# Patient Record
Sex: FEMALE | Race: OTHER | HISPANIC OR LATINO | Employment: FULL TIME | URBAN - METROPOLITAN AREA
[De-identification: names, ages, dates, MRNs, and addresses within clinical notes are randomized per-mention and may not be internally consistent; named-entity substitution may affect disease eponyms.]

---

## 2019-03-02 ENCOUNTER — OFFICE VISIT (OUTPATIENT)
Dept: URGENT CARE | Facility: CLINIC | Age: 29
End: 2019-03-02
Payer: COMMERCIAL

## 2019-03-02 VITALS
RESPIRATION RATE: 14 BRPM | TEMPERATURE: 98 F | HEART RATE: 75 BPM | SYSTOLIC BLOOD PRESSURE: 122 MMHG | DIASTOLIC BLOOD PRESSURE: 84 MMHG

## 2019-03-02 DIAGNOSIS — J02.9 VIRAL PHARYNGITIS: ICD-10-CM

## 2019-03-02 DIAGNOSIS — R05.9 COUGH: Primary | ICD-10-CM

## 2019-03-02 LAB
CTP QC/QA: YES
FLUAV AG NPH QL: NEGATIVE
FLUBV AG NPH QL: NEGATIVE

## 2019-03-02 PROCEDURE — 99204 OFFICE O/P NEW MOD 45 MIN: CPT | Mod: 25,S$GLB,, | Performed by: FAMILY MEDICINE

## 2019-03-02 PROCEDURE — 87804 INFLUENZA ASSAY W/OPTIC: CPT | Mod: QW,S$GLB,, | Performed by: FAMILY MEDICINE

## 2019-03-02 PROCEDURE — 96372 THER/PROPH/DIAG INJ SC/IM: CPT | Mod: S$GLB,,, | Performed by: FAMILY MEDICINE

## 2019-03-02 PROCEDURE — 99204 PR OFFICE/OUTPT VISIT, NEW, LEVL IV, 45-59 MIN: ICD-10-PCS | Mod: 25,S$GLB,, | Performed by: FAMILY MEDICINE

## 2019-03-02 PROCEDURE — 96372 PR INJECTION,THERAP/PROPH/DIAG2ST, IM OR SUBCUT: ICD-10-PCS | Mod: S$GLB,,, | Performed by: FAMILY MEDICINE

## 2019-03-02 PROCEDURE — 87804 POCT INFLUENZA A/B: ICD-10-PCS | Mod: QW,S$GLB,, | Performed by: FAMILY MEDICINE

## 2019-03-02 RX ORDER — DEXAMETHASONE SODIUM PHOSPHATE 100 MG/10ML
10 INJECTION INTRAMUSCULAR; INTRAVENOUS
Status: COMPLETED | OUTPATIENT
Start: 2019-03-02 | End: 2019-03-02

## 2019-03-02 RX ADMIN — DEXAMETHASONE SODIUM PHOSPHATE 10 MG: 100 INJECTION INTRAMUSCULAR; INTRAVENOUS at 04:03

## 2019-03-02 NOTE — LETTER
March 2, 2019      [facility] [address], [address]  [facility] [address]  Phone: [phone]  Fax: [phone]       Patient: Divya Wilson   YOB: 1990  Date of Visit: 03/02/2019    To Whom It May Concern:    Harry Wilson  was at [facility] on 03/02/2019. She may return to work/school on Tuesday, 03/05/2019 . If you have any questions or concerns, or if I can be of further assistance, please do not hesitate to contact me.    Sincerely,    Allan Rodriguez M.D.

## 2019-03-02 NOTE — PATIENT INSTRUCTIONS
Viral Pharyngitis (Sore Throat)    You (or your child, if your child is the patient) have pharyngitis (sore throat). This infection is caused by a virus. It can cause throat pain that is worse when swallowing, aching all over, headache, and fever. The infection may be spread by coughing, kissing, or touching others after touching your mouth or nose. Antibiotic medications do not work against viruses, so they are not used for treating this condition.  Home care  · If your symptoms are severe, rest at home. Return to work or school when you feel well enough.   · Drink plenty of fluids to avoid dehydration.  · For children: Use acetaminophen for fever, fussiness or discomfort. In infants over six months of age, you may use ibuprofen instead of acetaminophen. (NOTE: If your child has chronic liver or kidney disease or ever had a stomach ulcer or GI bleeding, talk with your doctor before using these medicines.) (NOTE: Aspirin should never be used in anyone under 18 years of age who is ill with a fever. It may cause severe liver damage.)   · For adults: You may use acetaminophen or ibuprofen to control pain or fever, unless another medicine was prescribed for this. (NOTE: If you have chronic liver or kidney disease or ever had a stomach ulcer or GI bleeding, talk with your doctor before using these medicines.)  · Throat lozenges or numbing throat sprays can help reduce pain. Gargling with warm salt water will also help reduce throat pain. For this, dissolve 1/2 teaspoon of salt in 1 glass of warm water. To help soothe a sore throat, children can sip on juice or a popsicle. Children 5 years and older can also suck on a lollipop or hard candy.  · Avoid salty or spicy foods, which can be irritating to the throat.  Follow-up care  Follow up with your healthcare provider or our staff if you are not improving over the next week.  When to seek medical advice  Call your healthcare provider right away if any of these  occur:  · Fever as directed by your doctor.  For children, seek care if:  ¨ Your child is of any age and has repeated fevers above 104°F (40°C).  ¨ Your child is younger than 2 years of age and has a fever of 100.4°F (38°C) that continues for more than 1 day.  ¨ Your child is 2 years old or older and has a fever of 100.4°F (38°C) that continues for more than 3 days.  · New or worsening ear pain, sinus pain, or headache  · Painful lumps in the back of neck  · Stiff neck  · Lymph nodes are getting larger  · Inability to swallow liquids, excessive drooling, or inability to open mouth wide due to throat pain  · Signs of dehydration (very dark urine or no urine, sunken eyes, dizziness)  · Trouble breathing or noisy breathing  · Muffled voice  · New rash  · Child appears to be getting sicker  Date Last Reviewed: 4/13/2015  © 7314-5514 [employer] [address] [place] [address], [place] [address]. All rights reserved. This information is not intended as a substitute for professional medical care. Always follow your healthcare professional's instructions.

## 2019-03-02 NOTE — PROGRESS NOTES
Subjective:       Patient ID: Divya Wilson is a 28 y.o. female.    Vitals:  oral temperature is 97.5 °F (36.4 °C). Her blood pressure is 122/84 and her pulse is 75. Her respiration is 14.     Chief Complaint: Nasal Congestion    Pt c/o nasal congestion, started yesterday, post nasal drip, sneezing, productive cough, sore throat, bilateral ear congestion, fever unmeasured, taking elderberry cold and flu with no relief       URI    This is a new problem. The current episode started yesterday. The problem has been unchanged. Associated symptoms include congestion, coughing, sneezing and a sore throat. Pertinent negatives include no ear pain, headaches, nausea, rash, sinus pain, vomiting or wheezing. Treatments tried: elderberry cold and flu. The treatment provided no relief.       Constitution: Positive for fatigue and fever (unmeasured). Negative for chills and sweating.   HENT: Positive for congestion and sore throat. Negative for ear pain, sinus pain, sinus pressure and voice change.    Neck: Negative for painful lymph nodes.   Eyes: Negative for eye redness.   Respiratory: Positive for cough and sputum production. Negative for chest tightness, bloody sputum, COPD, shortness of breath, stridor, wheezing and asthma.    Gastrointestinal: Negative for nausea and vomiting.   Musculoskeletal: Negative for muscle ache.   Skin: Negative for rash.   Allergic/Immunologic: Positive for sneezing. Negative for seasonal allergies and asthma.   Neurological: Negative for headaches.   Hematologic/Lymphatic: Negative for swollen lymph nodes.       Objective:      Physical Exam   Constitutional: She is oriented to person, place, and time. She appears well-developed and well-nourished. She is cooperative.  Non-toxic appearance. She does not appear ill. No distress.   HENT:   Head: Normocephalic and atraumatic.   Right Ear: Hearing, tympanic membrane, external ear and ear canal normal.   Left Ear: Hearing, tympanic membrane,  external ear and ear canal normal.   Nose: Nose normal. No mucosal edema, rhinorrhea or nasal deformity. No epistaxis. Right sinus exhibits no maxillary sinus tenderness and no frontal sinus tenderness. Left sinus exhibits no maxillary sinus tenderness and no frontal sinus tenderness.   Mouth/Throat: Uvula is midline, oropharynx is clear and moist and mucous membranes are normal. No trismus in the jaw. Normal dentition. No uvula swelling. No posterior oropharyngeal erythema.   Eyes: Conjunctivae and lids are normal. No scleral icterus.   Sclera clear bilat   Neck: Trachea normal, full passive range of motion without pain and phonation normal. Neck supple.   Cardiovascular: Normal rate, regular rhythm, normal heart sounds, intact distal pulses and normal pulses.   Pulmonary/Chest: Effort normal and breath sounds normal. No respiratory distress.   Abdominal: Soft. Normal appearance and bowel sounds are normal. She exhibits no distension. There is no tenderness.   Musculoskeletal: Normal range of motion. She exhibits no edema or deformity.   Neurological: She is alert and oriented to person, place, and time. She exhibits normal muscle tone. Coordination normal.   Skin: Skin is warm, dry and intact. She is not diaphoretic. No pallor.   Psychiatric: She has a normal mood and affect. Her speech is normal and behavior is normal. Judgment and thought content normal. Cognition and memory are normal.   Nursing note and vitals reviewed.      Assessment:       1. Cough    2. Viral pharyngitis        Plan:         Cough  -     POCT Influenza A/B    Viral pharyngitis    Other orders  -     dexamethasone injection 10 mg     Viral precautions discussed.  Influenza titers are negative.  Infectious precautions also reviewed literature provided patient is stable for discharge.

## 2020-08-03 ENCOUNTER — OFFICE VISIT (OUTPATIENT)
Dept: URGENT CARE | Facility: CLINIC | Age: 30
End: 2020-08-03
Payer: COMMERCIAL

## 2020-08-03 VITALS
SYSTOLIC BLOOD PRESSURE: 130 MMHG | TEMPERATURE: 98 F | RESPIRATION RATE: 18 BRPM | DIASTOLIC BLOOD PRESSURE: 87 MMHG | WEIGHT: 186 LBS | HEART RATE: 82 BPM | BODY MASS INDEX: 34.23 KG/M2 | HEIGHT: 62 IN | OXYGEN SATURATION: 98 %

## 2020-08-03 DIAGNOSIS — M54.9 DORSALGIA, UNSPECIFIED: ICD-10-CM

## 2020-08-03 DIAGNOSIS — M54.50 ACUTE MIDLINE LOW BACK PAIN WITHOUT SCIATICA: Primary | ICD-10-CM
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
## 2020-08-03 PROCEDURE — 99214 PR OFFICE/OUTPT VISIT, EST, LEVL IV, 30-39 MIN: ICD-10-PCS | Mod: S$GLB,,, | Performed by: NURSE PRACTITIONER

## 2020-08-03 PROCEDURE — 99214 OFFICE O/P EST MOD 30 MIN: CPT | Mod: S$GLB,,, | Performed by: NURSE PRACTITIONER

## 2020-08-03 PROCEDURE — 72100 X-RAY EXAM L-S SPINE 2/3 VWS: CPT | Mod: S$GLB,,, | Performed by: RADIOLOGY

## 2020-08-03 PROCEDURE — 72100 XR LUMBAR SPINE 2 OR 3 VIEWS: ICD-10-PCS | Mod: S$GLB,,, | Performed by: RADIOLOGY

## 2020-08-03 RX ORDER — NAPROXEN 500 MG/1
500 TABLET ORAL 2 TIMES DAILY WITH MEALS
Qty: 20 TABLET | Refills: 0 | Status: SHIPPED | OUTPATIENT
Start: 2020-08-03 | End: 2020-08-03

## 2020-08-03 RX ORDER — CYCLOBENZAPRINE HCL 10 MG
10 TABLET ORAL 3 TIMES DAILY PRN
Qty: 30 TABLET | Refills: 0 | Status: SHIPPED | OUTPATIENT
Start: 2020-08-03 | End: 2020-08-03

## 2020-08-03 RX ORDER — NAPROXEN 500 MG/1
500 TABLET ORAL 2 TIMES DAILY WITH MEALS
Qty: 20 TABLET | Refills: 0 | Status: SHIPPED | OUTPATIENT
Start: 2020-08-03 | End: 2020-08-13

## 2020-08-03 RX ORDER — CYCLOBENZAPRINE HCL 10 MG
10 TABLET ORAL 3 TIMES DAILY PRN
Qty: 30 TABLET | Refills: 0 | Status: SHIPPED | OUTPATIENT
Start: 2020-08-03 | End: 2020-08-13

## 2020-08-03 NOTE — PATIENT INSTRUCTIONS
Follow up with your doctor in a few days.  Return to the urgent care or go to the ER if symptoms get worse.      Avoid lifting/twisting/bending this week.   naproxyn twice daily  Flexeril to aid for muscle relaxing- will cause drowsiness.  Cool for the next 24-48 hours, then heat to area.  Close follow up with orthopedics if not improving.  ER precautions if lose of bowel function, severe pain.      Back Pain (Acute or Chronic)    Back pain is one of the most common problems. The good news is that most people feel better in 1 to 2 weeks, and most of the rest in 1 to 2 months. Most people can remain active.  People experience and describe pain differently; not everyone is the same.  · The pain can be sharp, stabbing, shooting, aching, cramping or burning.  · Movement, standing, bending, lifting, sitting, or walking may worsen pain.  · It can be localized to one spot or area, or it can be more generalized.  · It can spread or radiate upwards, to the front, or go down your arms or legs (sciatica).  · It can cause muscle spasm.  Most of the time, mechanical problems with the muscles or spine cause the pain. Mechanical problems are usually caused by an injury to the muscles or ligaments. While illness can cause back pain, it is usually not caused by a serious illness. Mechanical problems include:   · Physical activity such as sports, exercise, work, or normal activity  · Overexertion, lifting, pushing, pulling incorrectly or too aggressively  · Sudden twisting, bending, or stretching from an accident, or accidental movement  · Poor posture  · Stretching or moving wrong, without noticing pain at the time  · Poor coordination, lack of regular exercise (check with your doctor about this)  · Spinal disc disease or arthritis  · Stress  Pain can also be related to pregnancy, or illness like appendicitis, bladder or kidney infections, pelvic infections, and many other things.  Acute back pain usually gets better in 1 to 2  weeks. Back pain related to disk disease, arthritis in the spinal joints or spinal stenosis (narrowing of the spinal canal) can become chronic and last for months or years.  Unless you had a physical injury (for example, a car accident or fall) X-rays are usually not needed for the initial evaluation of back pain. If pain continues and does not respond to medical treatment, X-rays and other tests may be needed.  Home care  Try these home care recommendations:  · When in bed, try to find a position of comfort. A firm mattress is best. Try lying flat on your back with pillows under your knees. You can also try lying on your side with your knees bent up towards your chest and a pillow between your knees.  · At first, do not try to stretch out the sore spots. If there is a strain, it is not like the good soreness you get after exercising without an injury. In this case, stretching may make it worse.  · Avoid prolong sitting, long car rides, or travel. This puts more stress on the lower back than standing or walking.  · During the first 24 to 72 hours after an acute injury or flare up of chronic back pain, apply an ice pack to the painful area for 20 minutes and then remove it for 20 minutes. Do this over a period of 60 to 90 minutes or several times a day. This will reduce swelling and pain. Wrap the ice pack in a thin towel or plastic to protect your skin.  · You can start with ice, then switch to heat. Heat (hot shower, hot bath, or heating pad) reduces pain and works well for muscle spasms. Heat can be applied to the painful area for 20 minutes then remove it for 20 minutes. Do this over a period of 60 to 90 minutes or several times a day. Do not sleep on a heating pad. It can lead to skin burns or tissue damage.  · You can alternate ice and heat therapy. Talk with your doctor about the best treatment for your back pain.  · Therapeutic massage can help relax the back muscles without stretching them.  · Be aware of  safe lifting methods and do not lift anything without stretching first.  Medicines  Talk to your doctor before using medicine, especially if you have other medical problems or are taking other medicines.  · You may use over-the-counter medicine as directed on the bottle to control pain, unless another pain medicine was prescribed. If you have chronic conditions like diabetes, liver or kidney disease, stomach ulcers, or gastrointestinal bleeding, or are taking blood thinners, talk to your doctor before taking any medicine.  · Be careful if you are given a prescription medicines, narcotics, or medicine for muscle spasms. They can cause drowsiness, affect your coordination, reflexes, and judgement. Do not drive or operate heavy machinery.  Follow-up care  Follow up with your healthcare provider, or as advised.   A radiologist will review any X-rays that were taken. Your provide will notify you of any new findings that may affect your care.  Call 911  Call emergency services if any of the following occur:  · Trouble breathing  · Confusion  · Very drowsy or trouble awakening  · Fainting or loss of consciousness  · Rapid or very slow heart rate  · Loss of bowel or bladder control  When to seek medical advice  Call your healthcare provider right away if any of these occur:   · Pain becomes worse or spreads to your legs  · Weakness or numbness in one or both legs  · Numbness in the groin or genital area  Date Last Reviewed: 7/1/2016  © 9261-2471 [employer]. [address] [place] [address], [place] [address]. All rights reserved. This information is not intended as a substitute for professional medical care. Always follow your healthcare professional's instructions.

## 2020-08-03 NOTE — LETTER
August 3, 2020      [facility] [address], [address]  [facility] [address]  Phone: [phone]  Fax: [phone]       Patient: Divya Wilson   YOB: 1990  Date of Visit: 08/03/2020    To Whom It May Concern:    Harry Wilson  was at [facility] on 08/03/2020. She may return to work/school on 8/5/2020 with no restrictions. If you have any questions or concerns, or if I can be of further assistance, please do not hesitate to contact me.    Sincerely,      Ericka Sloan NP
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
## 2020-08-11 ENCOUNTER — OFFICE VISIT (OUTPATIENT)
Dept: URGENT CARE | Facility: CLINIC | Age: 30
End: 2020-08-11
Payer: COMMERCIAL

## 2020-08-11 VITALS
RESPIRATION RATE: 16 BRPM | SYSTOLIC BLOOD PRESSURE: 136 MMHG | HEART RATE: 104 BPM | WEIGHT: 186 LBS | BODY MASS INDEX: 34.23 KG/M2 | DIASTOLIC BLOOD PRESSURE: 89 MMHG | OXYGEN SATURATION: 98 % | TEMPERATURE: 97 F | HEIGHT: 62 IN
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
## 2020-08-11 DIAGNOSIS — M54.50 ACUTE RIGHT-SIDED LOW BACK PAIN WITHOUT SCIATICA: ICD-10-CM

## 2020-08-11 LAB
BILIRUB UR QL STRIP: NEGATIVE
GLUCOSE UR QL STRIP: NEGATIVE
KETONES UR QL STRIP: NEGATIVE
LEUKOCYTE ESTERASE UR QL STRIP: NEGATIVE
PH, POC UA: 6.5 (ref 5–8)
POC BLOOD, URINE: NEGATIVE
POC NITRATES, URINE: NEGATIVE
PROT UR QL STRIP: NEGATIVE
SP GR UR STRIP: 1.01 (ref 1–1.03)
UROBILINOGEN UR STRIP-ACNC: NORMAL (ref 0.1–1.1)

## 2020-08-11 PROCEDURE — 81003 POCT URINALYSIS, DIPSTICK, AUTOMATED, W/O SCOPE: ICD-10-PCS | Mod: QW,S$GLB,, | Performed by: NURSE PRACTITIONER

## 2020-08-11 PROCEDURE — 99214 OFFICE O/P EST MOD 30 MIN: CPT | Mod: 25,S$GLB,, | Performed by: NURSE PRACTITIONER

## 2020-08-11 PROCEDURE — 99214 PR OFFICE/OUTPT VISIT, EST, LEVL IV, 30-39 MIN: ICD-10-PCS | Mod: 25,S$GLB,, | Performed by: NURSE PRACTITIONER

## 2020-08-11 PROCEDURE — 81003 URINALYSIS AUTO W/O SCOPE: CPT | Mod: QW,S$GLB,, | Performed by: NURSE PRACTITIONER

## 2020-08-11 PROCEDURE — 96372 THER/PROPH/DIAG INJ SC/IM: CPT | Mod: S$GLB,,, | Performed by: NURSE PRACTITIONER

## 2020-08-11 PROCEDURE — 96372 PR INJECTION,THERAP/PROPH/DIAG2ST, IM OR SUBCUT: ICD-10-PCS | Mod: S$GLB,,, | Performed by: NURSE PRACTITIONER

## 2020-08-11 RX ORDER — NAPROXEN 500 MG/1
500 TABLET ORAL 2 TIMES DAILY WITH MEALS
Qty: 14 TABLET | Refills: 0 | Status: SHIPPED | OUTPATIENT
Start: 2020-08-11 | End: 2020-08-18

## 2020-08-11 RX ORDER — KETOROLAC TROMETHAMINE 30 MG/ML
30 INJECTION, SOLUTION INTRAMUSCULAR; INTRAVENOUS
Status: COMPLETED | OUTPATIENT
Start: 2020-08-11 | End: 2020-08-11

## 2020-08-11 RX ADMIN — KETOROLAC TROMETHAMINE 30 MG: 30 INJECTION, SOLUTION INTRAMUSCULAR; INTRAVENOUS at 07:08

## 2020-08-12 ENCOUNTER — TELEPHONE (OUTPATIENT)
Dept: ORTHOPEDICS | Facility: CLINIC | Age: 30
End: 2020-08-12

## 2020-08-12 NOTE — PATIENT INSTRUCTIONS
PLEASE READ YOUR DISCHARGE INSTRUCTIONS ENTIRELY AS IT CONTAINS IMPORTANT INFORMATION.     DO NOT TAKE ANY MORE NAPROXEN OR IBUPROFEN FOR 24 HOURS AS YOU RECEIVED A LARGE DOSE OF IT VIA INJECTION    Naproxen for pain. Eat with this medication. Consider taking an over the counter antacid (Pepcid, Nexium, Prilosec) to protect your stomach.      Ice to the area. Try an over the counter pain cream like salon pas, icy hot, bioflex.    Flexeril is a muscle relaxer - this medication will make you drowsy. Please do not drive or operate machinery while taking this. Best to take it at night or during the day if you are not driving or going to work. Please take a half first to see how it affects you.     Avoid heavy lifting, straining.      A referral to Orthopedics has been placed for you.  Please call [phone] to make an appt    Please follow up with your regular doctor for further treatment if your symptoms do not improve.      Please arrange follow up with your primary medical clinic as soon as possible. You must understand that you've received an Urgent Care treatment only and that you may be released before all of your medical problems are known or treated. You, the patient, will arrange for follow up as instructed. If your symptoms worsen or fail to improve you should go to the Emergency Room.  WE CANNOT RULE OUT ALL POSSIBLE CAUSES OF YOUR SYMPTOMS IN THE URGENT CARE SETTING PLEASE GO TO THE ER IF YOU FEELS YOUR CONDITION IS WORSENING OR YOU WOULD LIKE EMERGENT EVALUATION.        Causes of Lumbar (Low Back) Pain  Low back pain can be caused by problems with any part of the lumbar spine. A disk can herniate (push out) and press on a nerve. Vertebrae can rub against each other or slip out of place. This can irritate facet joints and nerves. It can also lead to stenosis, a narrowing of the spinal canal or foramen.  Pressure from a disk  Constant wear and tear on a disk can cause it to weaken and push outward. Part  of the disk may then press on nearby nerves. There are two common types of herniated disks:  Contained means the soft nucleus is protruding outward.   Extruded means the firm annulus has torn, letting the soft center squeeze through.     Pressure from bone  An unstable spine   With age, a disk may thin and wear out. Vertebrae above and below the disk may begin to touch. This can put pressure on nerves. It can also cause bone spurs (growths) to form where the bones rub together.    Stenosis results when bone spurs narrow the foramen or spinal canal. This also puts pressure on nerves. Slipping vertebrae can irritate nerves and joints. They can also worsen stenosis.    In some cases, vertebrae become unstable and slip forward. This is called spondylolisthesis.     Date Last Reviewed: 10/12/2015  © 9544-2131 [employer]. [address] [place] [address], [place] [address]. All rights reserved. This information is not intended as a substitute for professional medical care. Always follow your healthcare professional's instructions.
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
## 2020-08-13 ENCOUNTER — TELEPHONE (OUTPATIENT)
Dept: ORTHOPEDICS | Facility: CLINIC | Age: 30
End: 2020-08-13

## 2020-08-21 ENCOUNTER — OFFICE VISIT (OUTPATIENT)
Dept: SPINE | Facility: CLINIC | Age: 30
End: 2020-08-21
Payer: COMMERCIAL

## 2020-08-21 VITALS
SYSTOLIC BLOOD PRESSURE: 128 MMHG | WEIGHT: 186.06 LBS | BODY MASS INDEX: 34.24 KG/M2 | HEIGHT: 62 IN | HEART RATE: 98 BPM | DIASTOLIC BLOOD PRESSURE: 88 MMHG

## 2020-08-21 DIAGNOSIS — S39.012A STRAIN OF LUMBAR REGION, INITIAL ENCOUNTER: Primary | ICD-10-CM

## 2020-08-21 PROCEDURE — 3008F BODY MASS INDEX DOCD: CPT | Mod: S$GLB,,, | Performed by: PHYSICAL MEDICINE & REHABILITATION

## 2020-08-21 PROCEDURE — 3008F PR BODY MASS INDEX (BMI) DOCUMENTED: ICD-10-PCS | Mod: S$GLB,,, | Performed by: PHYSICAL MEDICINE & REHABILITATION

## 2020-08-21 PROCEDURE — 99204 OFFICE O/P NEW MOD 45 MIN: CPT | Mod: S$GLB,,, | Performed by: PHYSICAL MEDICINE & REHABILITATION

## 2020-08-21 PROCEDURE — 99204 PR OFFICE/OUTPT VISIT, NEW, LEVL IV, 45-59 MIN: ICD-10-PCS | Mod: S$GLB,,, | Performed by: PHYSICAL MEDICINE & REHABILITATION

## 2020-08-21 RX ORDER — CYCLOBENZAPRINE HCL 10 MG
10 TABLET ORAL 3 TIMES DAILY PRN
COMMUNITY
End: 2021-05-24

## 2020-08-21 RX ORDER — NAPROXEN 500 MG/1
500 TABLET ORAL 2 TIMES DAILY
COMMUNITY
End: 2020-08-21 | Stop reason: ALTCHOICE

## 2020-08-21 RX ORDER — ETODOLAC 400 MG/1
400 TABLET, FILM COATED ORAL
Qty: 42 TABLET | Refills: 0 | Status: SHIPPED | OUTPATIENT
Start: 2020-08-21 | End: 2021-05-24
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
## 2020-08-25 ENCOUNTER — CLINICAL SUPPORT (OUTPATIENT)
Dept: REHABILITATION | Facility: HOSPITAL | Age: 30
End: 2020-08-25
Payer: COMMERCIAL

## 2020-08-25 DIAGNOSIS — M62.81 MUSCLE WEAKNESS: ICD-10-CM

## 2020-08-25 DIAGNOSIS — M54.50 ACUTE RIGHT-SIDED LOW BACK PAIN WITHOUT SCIATICA: ICD-10-CM

## 2020-08-25 DIAGNOSIS — S39.012A STRAIN OF LUMBAR REGION, INITIAL ENCOUNTER: ICD-10-CM

## 2020-08-25 DIAGNOSIS — R26.89 DECREASED FUNCTIONAL MOBILITY: ICD-10-CM

## 2020-08-25 PROCEDURE — 97161 PT EVAL LOW COMPLEX 20 MIN: CPT | Mod: PN

## 2020-08-25 PROCEDURE — 97110 THERAPEUTIC EXERCISES: CPT | Mod: PN
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
## 2020-09-09 ENCOUNTER — TELEPHONE (OUTPATIENT)
Dept: SPINE | Facility: CLINIC | Age: 30
End: 2020-09-09

## 2020-09-09 ENCOUNTER — CLINICAL SUPPORT (OUTPATIENT)
Dept: REHABILITATION | Facility: HOSPITAL | Age: 30
End: 2020-09-09
Payer: COMMERCIAL

## 2020-09-09 DIAGNOSIS — M62.81 MUSCLE WEAKNESS: Primary | ICD-10-CM

## 2020-09-09 DIAGNOSIS — R26.89 DECREASED FUNCTIONAL MOBILITY: ICD-10-CM

## 2020-09-09 PROCEDURE — 97110 THERAPEUTIC EXERCISES: CPT | Mod: PN

## 2020-09-09 NOTE — PROGRESS NOTES
Physical Therapy Treatment Note     Name: Divya Wilson  Clinic Number: [id number]    Therapy Diagnosis:   Encounter Diagnoses   Name Primary?    Muscle weakness Yes    Decreased functional mobility      Physician: Peter Gonzalez MD    Visit Date: 9/9/2020    Physician Orders: PT Eval and Treat   Medical Diagnosis from Referral: Strain of lumbar region, initial encounter  Evaluation Date: 8/25/2020  Authorization Period Expiration: 12/31/2020  Plan of Care Expiration: 10/23/2020  Visit # / Visits authorized: 1/ 12 (+1/1 from eval)    Time In: 2:35  Time Out: 3:20  Total Billable Time: 35 minutes    Precautions: Standard    Subjective     Pt reports: she is having pain with pushing or lifting items. 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 She is not having constant pain on R side.   She was compliant with home exercise program.  Response to previous treatment: sore, but otherwise no more pain, some relief with exercises.   Functional change: none noted yet    Pain: 5/10  Location: right back      Objective     Divya received therapeutic exercises to develop strength, ROM, flexibility and core stabilization for 35 minutes including:  Push/pull (R hip ext, L hip flex)3x3 sec  glute sets 20x5 sec  TA set x 10 with 3 second hold  Clams x 20  Bridge x 15  Modified piriformis stretch 3x20 sec  Supine HS stretch 10x10 sec      May add: hip fall outs, supine march with TA contraction    Divya received cold pack for 10 minutes to lumbar spine in supine with LE in hook lying.    Home Exercises Provided and Patient Education Provided     Education provided:   - log roll to decrease stress to lumbar spine with supine to sit  - performance of push pull only with R into ext and L flex  - performance of stretches B  Pt gave verbal understanding to all education provided      Written  Home Exercises Provided: Patient instructed to cont prior HEP and gave instructions on log roll  Exercises were reviewed and Divya was able to demonstrate them prior to the end of the session.  Divya demonstrated good  understanding of the education provided.     See EMR under Patient Instructions for exercises provided 8/25/2020, 9/9/2020.    Assessment     Pt required re-education of performance of push pull with proper technique and only to perform with R into ext and L into flexion. She required re-education on log roll for supine to sit. She was progressed with exercises without reports of increased pain. Pt with relief with push pull.   Divya is progressing well towards her goals.   Pt prognosis is Excellent.     Pt will continue to benefit from skilled outpatient physical therapy to address the deficits listed in the problem list box on initial evaluation, provide pt/family education and to maximize pt's level of independence in the home and community environment.     Pt's spiritual, cultural and educational needs considered and pt agreeable to plan of care and goals.     Anticipated barriers to physical therapy: work schedule    Goals:   Short Term Goals:  4 weeks (in progress, not met)  1.Report decreased    Low back    pain  <   / =  4  /10 at its worst  to increase tolerance for ADLs and work activities.  2. Pt to increase B popliteal angle by > or = 5 degrees in order to improve flexibility and posture.   3. Increased R LE strength by 1/3 muscle grade in all deficient planes to increase tolerance for ADL and work activities.  4. Increased L LE strength by 1/3 muscle grade in all deficient planes to increase tolerance for ADL and work activities.  5. Pt will be independent with push/pull to achieve level pelvis for increased ease with job duties.  6. Pt to tolerate HEP to improve ROM and independence with ADL's  7. Pt will report ability to sleep through the night without back pain.  8. Pt will  demonstrate understanding of proper body mechanics for lifting, pushing, and pulling for increased ease with job tasks.     Long Term Goals: 8 weeks (in progress, not met)  1.Report decreased    Low back    pain  <   / =  1  /10  to increase tolerance for ADLs  2.Pt to increase B popliteal angle by > or = 20 degrees in order to improve flexibility and posture.   3.Increased B LE strength to 4+/5 to 5/5 in all deficient planes to increase tolerance for ADL and work activities.  4. Pt will present with level pelvis and maintain it throughout session for increased ease with ADLs.  5.Pt will lift 10-15 box floor to/from waist with good body mechanics without pain for increased ease with job tasks.  6. Pt to be Independent with HEP to improve ROM and independence with ADL's    Plan     May add: hip fall outs, supine march with TA contraction  Distribute clams and bridge next visit if no increase in symptoms    Outpatient Physical Therapy 2 times weekly for 8 weeks to include the following interventions: Electrical Stimulation  , Manual Therapy, Moist Heat/ Ice, Patient Education, Self Care, Therapeutic Activites, Therapeutic Exercise and dry needling.     Alberto Aguilar, PT

## 2020-09-09 NOTE — TELEPHONE ENCOUNTER
----- Message from Cris Gomez sent at 9/9/2020  3:29 PM CDT -----  Contact: Patient [phone]  Patient was seen on 08/21/2020. Has paperwork that needs to be filled out and needs a referral to Psych.    Please call and advise.    Thank You

## 2020-09-10 NOTE — TELEPHONE ENCOUNTER
----- Message from Berkley Philhiginio sent at 9/10/2020 12:27 PM CDT -----  Contact: self  Type: Needs Medical Advice  Who Called:  patient  Symptoms (please be specific):  She is having trouble finding a psychiatrist [facility] in not taking new patients  Best Call Back Number: [phone] (home)   Additional Information: Do you know any other location she can try

## 2020-09-11 ENCOUNTER — CLINICAL SUPPORT (OUTPATIENT)
Dept: REHABILITATION | Facility: HOSPITAL | Age: 30
End: 2020-09-11
Payer: COMMERCIAL

## 2020-09-11 ENCOUNTER — TELEPHONE (OUTPATIENT)
Dept: SPINE | Facility: CLINIC | Age: 30
End: 2020-09-11

## 2020-09-11 DIAGNOSIS — R26.89 DECREASED FUNCTIONAL MOBILITY: ICD-10-CM

## 2020-09-11 DIAGNOSIS — M62.81 MUSCLE WEAKNESS: Primary | ICD-10-CM

## 2020-09-11 PROCEDURE — 97110 THERAPEUTIC EXERCISES: CPT | Mod: PN

## 2020-09-11 NOTE — TELEPHONE ENCOUNTER
----- Message from Gissel Madrid sent at 9/11/2020 11:53 AM CDT -----  Contact: self/[phone]  Pt called in regards to checking to see if the office have received her HR PAPER WORK. It was faxed over on yesterday afternoon.    Please advise

## 2020-09-11 NOTE — PROGRESS NOTES
Physical Therapy Treatment Note     Name: Divya Wilson  Clinic Number: [id number]    Therapy Diagnosis:   Encounter Diagnoses   Name Primary?    Muscle weakness Yes    Decreased functional mobility      Physician: Peter Gonzalez MD    Visit Date: 9/11/2020    Physician Orders: PT Eval and Treat   Medical Diagnosis from Referral: Strain of lumbar region, initial encounter  Evaluation Date: 8/25/2020  Authorization Period Expiration: 12/31/2020  Plan of Care Expiration: 10/23/2020  Visit # / Visits authorized: 2/ 12 (+1/1 from eval)    Time In: 1:00  Time Out: 1:55  Total Billable Time: 35 minutes    Precautions: Standard    Subjective     Pt reports: she is in pain today. She did a lot over the past two days including standing to do dishes, sitting for an hour in a coffee shop with her , scrubbing bathroom floors, and laundry. She reports increased stress which may contribute to symptoms also. She is meeting with a counselor today to help with depression.    She was compliant with home exercise program.  Response to previous treatment: sore after exercises then pain increased on Wed. and Thurs. after activity.   Functional change: none noted yet    Pain: 7/10, decreased to 4/10 after treatment  Location: right back, and B low back.       Objective     Divya received therapeutic exercises to develop strength, ROM, flexibility and core stabilization for 35 minutes including:  Push/pull (R hip ext, L hip flex)3x3 sec  Modified piriformis stretch 3x20 sec  Supine HS stretch 10x10 sec   glute sets 20x5 sec  TA set x 10 with 3 second hold  Clams x 20  Bridge 2 x 10  Mid row yellow tubing 2 x 10  Shoulder extension yellow tubing 2 x 10      May add: hip fall outs, supine march with TA contraction    Divya received the following supervised modalities after being cleared for contradictions: Pre-mod Electrical Stimulation:  Divya received Pre- mod Electrical Stimulation for pain control applied to the B  lumbar spine along SI joint. Pt received stimulation at 100 % scan at a frequency of 80-150Mhz for 10 minutes. Divya tolderated treatment well without any adverse effects. Divya received cold pack for 10 minutes to lumbar spine in supine with LE in hook lying with e-stim..    Home Exercises Provided and Patient Education Provided     Education provided:   - log roll to decrease stress to lumbar spine with supine to sit  - performance of push pull only with R into ext and L flex  - importance of strengthening to upper back to decrease stress to lumbar spine  - Upon pts request, discussed expected time frame of healing - plan of care for 8 weeks. Stressed importance of avoiding inflammation and activities that increase pain in order to decrease inflammation to allow healing.   Pt gave verbal understanding to all education provided    Written Home Exercises Provided: Patient instructed to cont prior HEP and yes.  Exercises were reviewed and Divya was able to demonstrate them prior to the end of the session.  Divya demonstrated good  understanding of the education provided.     See EMR under Patient Instructions for exercises provided 8/25/2020, 9/9/2020, 9/11/2020.    Assessment     Pt's home exercises were progressed today due to only noted soreness after performing exercises. Advised patient to increase awareness of how much she is doing during the day in order to decrease inflammation and pain. She is making progress with performing push pull with noted decrease in pain after performing technique. Performed e-stim today due to increased pain along R SI and low back.   Diyva is progressing well towards her goals.   Pt prognosis is Excellent.     Pt will continue to benefit from skilled outpatient physical therapy to address the deficits listed in the problem list box on initial evaluation, provide pt/family education and to maximize pt's level of independence in the home and community environment.     Pt's  spiritual, cultural and educational needs considered and pt agreeable to plan of care and goals.     Anticipated barriers to physical therapy: work schedule    Goals:   Short Term Goals:  4 weeks (in progress, not met)  1.Report decreased    Low back    pain  <   / =  4  /10 at its worst  to increase tolerance for ADLs and work activities.  2. Pt to increase B popliteal angle by > or = 5 degrees in order to improve flexibility and posture.   3. Increased R LE strength by 1/3 muscle grade in all deficient planes to increase tolerance for ADL and work activities.  4. Increased L LE strength by 1/3 muscle grade in all deficient planes to increase tolerance for ADL and work activities.  5. Pt will be independent with push/pull to achieve level pelvis for increased ease with job duties.  6. Pt to tolerate HEP to improve ROM and independence with ADL's  7. Pt will report ability to sleep through the night without back pain.  8. Pt will demonstrate understanding of proper body mechanics for lifting, pushing, and pulling for increased ease with job tasks.     Long Term Goals: 8 weeks (in progress, not met)  1.Report decreased    Low back    pain  <   / =  1  /10  to increase tolerance for ADLs  2.Pt to increase B popliteal angle by > or = 20 degrees in order to improve flexibility and posture.   3.Increased B LE strength to 4+/5 to 5/5 in all deficient planes to increase tolerance for ADL and work activities.  4. Pt will present with level pelvis and maintain it throughout session for increased ease with ADLs.  5.Pt will lift 10-15 box floor to/from waist with good body mechanics without pain for increased ease with job tasks.  6. Pt to be Independent with HEP to improve ROM and independence with ADL's    Plan     May add: hip fall outs, supine march with TA contraction    Outpatient Physical Therapy 2 times weekly for 8 weeks to include the following interventions: Electrical Stimulation  , Manual Therapy, Moist Heat/ Ice,  Patient Education, Self Care, Therapeutic Activites, Therapeutic Exercise and dry needling.     Alberto Aguilar, PT

## 2020-09-16 ENCOUNTER — TELEPHONE (OUTPATIENT)
Dept: PAIN MEDICINE | Facility: CLINIC | Age: 30
End: 2020-09-16

## 2020-09-16 NOTE — TELEPHONE ENCOUNTER
----- Message from Vic Rivera sent at 9/16/2020  3:20 PM CDT -----  Type:  Patient Returning Call    Who Called:  Patient  Who Left Message for Patient:  Raymundo Sevilla  Does the patient know what this is regarding?:  see previous message  Best Call Back Number:  [phone]  Additional Information:  NA

## 2020-09-22 ENCOUNTER — CLINICAL SUPPORT (OUTPATIENT)
Dept: REHABILITATION | Facility: HOSPITAL | Age: 30
End: 2020-09-22
Payer: COMMERCIAL

## 2020-09-22 DIAGNOSIS — R26.89 DECREASED FUNCTIONAL MOBILITY: ICD-10-CM

## 2020-09-22 DIAGNOSIS — M62.81 MUSCLE WEAKNESS: ICD-10-CM

## 2020-09-22 PROCEDURE — 97014 ELECTRIC STIMULATION THERAPY: CPT | Mod: PN,CQ

## 2020-09-22 PROCEDURE — 97110 THERAPEUTIC EXERCISES: CPT | Mod: PN,CQ

## 2020-09-22 NOTE — PROGRESS NOTES
Physical Therapy Treatment Note     Name: Divya Wilson  Clinic Number: [id number]    Therapy Diagnosis:   Encounter Diagnoses   Name Primary?    Muscle weakness     Decreased functional mobility      Physician: Peter Gonzalez MD    Visit Date: 9/22/2020    Physician Orders: PT Eval and Treat   Medical Diagnosis from Referral: Strain of lumbar region, initial encounter  Evaluation Date: 8/25/2020  Authorization Period Expiration: 12/31/2020  Plan of Care Expiration: 10/23/2020  Visit # / Visits authorized: 3 / 12 (+1/1 from eval)    Time In: 12:15  Time Out: 1:12  Total Billable Time: 45 minutes    Precautions: Standard    Subjective     Pt reports: an improvement of symptoms overall but still having pain into the R buttock. Sometimes sharp pain but not as frequent. Has been self-quarantining at home for the last week to be safe since her fiance thought he had COVID but he was negative. With being home, he is helping more with housework which is also helping reduce pain for her back. Nervous about going back to work next week, that her pain may worsen again.  She was compliant with home exercise program.  Response to previous treatment: muscle soreness.   Functional change: able to do more reps of exercises with no increase of pain    Pain: 6/10, decreased to 4/10 after treatment  Location: right back, and B low back.       Objective     Divya received therapeutic exercises to develop strength, ROM, flexibility and core stabilization for 35 minutes including:  Push/pull (R hip ext, L hip flex)3x3 sec  Modified piriformis stretch 3x20 sec  Supine HS stretch 10x10 sec   glute sets 20x5 sec  TA set x 15 with 3 second hold  Bridge 2 x 10  Supine march + TA set x 10 each leg, alternating  Clams x 20  Mid row yellow tubing 2 x 10  Shoulder extension yellow tubing 2 x 10      May add: hip fall outs    Divya received the following supervised modalities after being cleared for contradictions: IFC Electrical  Stimulation:  Divya received IFC Electrical Stimulation for pain control applied to the R lumbar spine/ glut med region along SI joint. Pt received stimulation at 40 % scan at a frequency of 80-150Mhz for 10 minutes. Divya tolderated treatment well without any adverse effects. Divya received moist heat pack for 10 minutes to lumbar spine in supine with LE in hook lying with e-stim..    Home Exercises Provided and Patient Education Provided     Education provided:   - log roll to decrease stress to lumbar spine with supine to sit  - performance of push pull only with R into ext and L flex  - importance of strengthening to upper back to decrease stress to lumbar spine  - Upon pts request, discussed expected time frame of healing - plan of care for 8 weeks. Stressed importance of avoiding inflammation and activities that increase pain in order to decrease inflammation to allow healing.   Pt gave verbal understanding to all education provided    Written Home Exercises Provided: Patient instructed to cont prior HEP and yes.  Exercises were reviewed and Divya was able to demonstrate them prior to the end of the session.  Divya demonstrated good  understanding of the education provided.     See EMR under Patient Instructions for exercises provided 8/25/2020, 9/9/2020, 9/11/2020.    Assessment     Patient demonstrates overall good tolerance to treatment without exacerbation of symptoms. R sided low back had decreased pain toward end of session with patient more aware of symptom cues for need to push/pull. E-stim appears to assist well with reducing pain and discomfort. Use of moist heat instead of cold pack this date for greater comfort with patient and reduced tightness along low back.   Divya is progressing well towards her goals.   Pt prognosis is Excellent.     Pt will continue to benefit from skilled outpatient physical therapy to address the deficits listed in the problem list box on initial evaluation, provide  pt/family education and to maximize pt's level of independence in the home and community environment.     Pt's spiritual, cultural and educational needs considered and pt agreeable to plan of care and goals.     Anticipated barriers to physical therapy: work schedule    Goals:   Short Term Goals:  4 weeks (in progress, not met)  1.Report decreased    Low back    pain  <   / =  4  /10 at its worst  to increase tolerance for ADLs and work activities.  2. Pt to increase B popliteal angle by > or = 5 degrees in order to improve flexibility and posture.   3. Increased R LE strength by 1/3 muscle grade in all deficient planes to increase tolerance for ADL and work activities.  4. Increased L LE strength by 1/3 muscle grade in all deficient planes to increase tolerance for ADL and work activities.  5. Pt will be independent with push/pull to achieve level pelvis for increased ease with job duties.  6. Pt to tolerate HEP to improve ROM and independence with ADL's  7. Pt will report ability to sleep through the night without back pain.  8. Pt will demonstrate understanding of proper body mechanics for lifting, pushing, and pulling for increased ease with job tasks.     Long Term Goals: 8 weeks (in progress, not met)  1.Report decreased    Low back    pain  <   / =  1  /10  to increase tolerance for ADLs  2.Pt to increase B popliteal angle by > or = 20 degrees in order to improve flexibility and posture.   3.Increased B LE strength to 4+/5 to 5/5 in all deficient planes to increase tolerance for ADL and work activities.  4. Pt will present with level pelvis and maintain it throughout session for increased ease with ADLs.  5.Pt will lift 10-15 box floor to/from waist with good body mechanics without pain for increased ease with job tasks.  6. Pt to be Independent with HEP to improve ROM and independence with ADL's    Plan     May add: hip fall outs, supine march with TA contraction    Outpatient Physical Therapy 2 times weekly  for 8 weeks to include the following interventions: Electrical Stimulation  , Manual Therapy, Moist Heat/ Ice, Patient Education, Self Care, Therapeutic Activites, Therapeutic Exercise and dry needling.     Lisa Estes, PTA

## 2020-09-23 NOTE — PROGRESS NOTES
Physical Therapy Treatment Note     Name: Divya Wilson  Clinic Number: [id number]    Therapy Diagnosis:   Encounter Diagnoses   Name Primary?    Muscle weakness     Decreased functional mobility      Physician: Peter Gonzalez MD    Visit Date: 9/24/2020    Physician Orders: PT Eval and Treat   Medical Diagnosis from Referral: Strain of lumbar region, initial encounter  Evaluation Date: 8/25/2020  Authorization Period Expiration: 12/31/2020  Plan of Care Expiration: 10/23/2020  Visit # / Visits authorized: 4 / 12 (+1/1 from eval)    Time In: 11:10  Time Out: 11:50  Total Billable Time: 40 minutes    Precautions: Standard    Subjective     Pt reports: pt states her fiance has been around to help with activities around the house so she has been able to take it easy.  She states she still has back pain, but her pain intensity has decreased overall.  She states she did her push/pull and some of her HEP this morning.  Pt states the clams are getting easier.  She was compliant with home exercise program.  Response to previous treatment: muscle soreness.   Functional change: able to do more reps of exercises with no increase of pain    Pain: 6.5/10   Location: R low back     Objective     Divya received therapeutic exercises to develop strength, ROM, flexibility and core stabilization for 20 minutes including:  Push/pull (R hip ext, L hip flex)3x3 sec  Modified piriformis stretch 3x20 sec  NP Supine HS stretch 10x10 sec   TA set x 15 with 3 second hold  NP Bridge 2 x 10  Supine march + TA set x 10 each leg, alternating  TA sets with uni knee fall out x10 ea  NP Clams x 20 (will add YTB next visit)  NP Mid row yellow tubing 2 x 10  NP Shoulder extension yellow tubing 2 x 10      May add: sit to stand, dry needling as needed    Pt rec'd manual therapy x 10 min to include:  STM to R glute medius and R piriformis  Pin and stretch to R piriformis    Divya received the following supervised modalities after being  cleared for contradictions: Pre-mod Electrical Stimulation:  Divya received Pre- mod Electrical Stimulation for pain control applied to the B lumbar spine along SI joint. Pt received stimulation at 40 % scan at a frequency of 80-150Mhz for 10 minutes. Divya tolderated treatment well without any adverse effects. Divya received cold pack for 10 minutes to lumbar spine in supine with LE in hook lying with e-stim..    Home Exercises Provided and Patient Education Provided     Education provided:   - log roll to decrease stress to lumbar spine with supine to sit  -use of ice for decreased inflammation  -may benefit from dry needling to R glute and piriformis if continued tightness and pain  Pt gave verbal understanding to all education provided    Written Home Exercises Provided: Patient instructed to cont prior HEP.  Exercises were reviewed and Divya was able to demonstrate them prior to the end of the session.  Divya demonstrated good  understanding of the education provided.     See EMR under Patient Instructions for exercises provided 8/25/2020, 9/9/2020, 9/11/2020.    Assessment     Patient presented with level pelvis today, but performed push/pull as review and check form.  She is improving with tolerance for increased core challenges.  She presented with tightness and pain to R glute medius and R piriformis, which loosened somewhat with manual therapy.  Pt. Reported decreased pain to 5/10 to R buttock post tx.  Divya is progressing well towards her goals.   Pt prognosis is Excellent.     Pt will continue to benefit from skilled outpatient physical therapy to address the deficits listed in the problem list box on initial evaluation, provide pt/family education and to maximize pt's level of independence in the home and community environment.     Pt's spiritual, cultural and educational needs considered and pt agreeable to plan of care and goals.     Anticipated barriers to physical therapy: work  schedule    Goals:   Short Term Goals:  4 weeks (in progress, not met)  1.Report decreased    Low back    pain  <   / =  4  /10 at its worst  to increase tolerance for ADLs and work activities.  2. Pt to increase B popliteal angle by > or = 5 degrees in order to improve flexibility and posture.   3. Increased R LE strength by 1/3 muscle grade in all deficient planes to increase tolerance for ADL and work activities.  4. Increased L LE strength by 1/3 muscle grade in all deficient planes to increase tolerance for ADL and work activities.  5. Pt will be independent with push/pull to achieve level pelvis for increased ease with job duties.  6. Pt to tolerate HEP to improve ROM and independence with ADL's  7. Pt will report ability to sleep through the night without back pain.  8. Pt will demonstrate understanding of proper body mechanics for lifting, pushing, and pulling for increased ease with job tasks.     Long Term Goals: 8 weeks (in progress, not met)  1.Report decreased    Low back    pain  <   / =  1  /10  to increase tolerance for ADLs  2.Pt to increase B popliteal angle by > or = 20 degrees in order to improve flexibility and posture.   3.Increased B LE strength to 4+/5 to 5/5 in all deficient planes to increase tolerance for ADL and work activities.  4. Pt will present with level pelvis and maintain it throughout session for increased ease with ADLs.  5.Pt will lift 10-15 box floor to/from waist with good body mechanics without pain for increased ease with job tasks.  6. Pt to be Independent with HEP to improve ROM and independence with ADL's    Plan     Continue PT towards established goals.  May add: sit to stand, dry needling as needed    Jennifer Garcia, PT

## 2020-09-24 ENCOUNTER — CLINICAL SUPPORT (OUTPATIENT)
Dept: REHABILITATION | Facility: HOSPITAL | Age: 30
End: 2020-09-24
Payer: COMMERCIAL

## 2020-09-24 DIAGNOSIS — R26.89 DECREASED FUNCTIONAL MOBILITY: ICD-10-CM

## 2020-09-24 DIAGNOSIS — M62.81 MUSCLE WEAKNESS: ICD-10-CM

## 2020-09-24 PROCEDURE — 97110 THERAPEUTIC EXERCISES: CPT | Mod: PN

## 2020-09-24 PROCEDURE — 97014 ELECTRIC STIMULATION THERAPY: CPT | Mod: PN,GY

## 2020-09-24 PROCEDURE — 97140 MANUAL THERAPY 1/> REGIONS: CPT | Mod: PN

## 2020-09-28 ENCOUNTER — CLINICAL SUPPORT (OUTPATIENT)
Dept: REHABILITATION | Facility: HOSPITAL | Age: 30
End: 2020-09-28
Payer: COMMERCIAL

## 2020-09-28 DIAGNOSIS — R26.89 DECREASED FUNCTIONAL MOBILITY: ICD-10-CM

## 2020-09-28 DIAGNOSIS — M62.81 MUSCLE WEAKNESS: ICD-10-CM

## 2020-09-28 PROCEDURE — 97110 THERAPEUTIC EXERCISES: CPT | Mod: PN

## 2020-09-28 PROCEDURE — 97140 MANUAL THERAPY 1/> REGIONS: CPT | Mod: PN

## 2020-09-28 NOTE — PROGRESS NOTES
Physical Therapy Treatment Note     Name: Divya Wilson  Clinic Number: [id number]    Therapy Diagnosis:   Encounter Diagnoses   Name Primary?    Muscle weakness     Decreased functional mobility      Physician: Peter Gonzalez MD    Visit Date: 9/28/2020    Physician Orders: PT Eval and Treat   Medical Diagnosis from Referral: Strain of lumbar region, initial encounter  Evaluation Date: 8/25/2020  Authorization Period Expiration: 12/31/2020  Plan of Care Expiration: 10/23/2020  Visit # / Visits authorized: 5 / 12 (+1/1 from eval)    Time In: 12:20  Time Out: 1:10  Total Billable Time: 40 minutes    Precautions: Standard    Subjective     Pt reports: pt states she had a family member commit suicide over the weekend so she is very stressed and having more pain.  She states she has not done her HEP due to family stress.  She states her pain was a 9/10 this morning, but eased with ice.  She states she has to drive 1 hour after this appointment.  She was NOT compliant with home exercise program.  Response to previous treatment: muscle soreness, but felt looser  Functional change: able to do more reps of exercises with no increase of pain    Pain: 7/10   Location: R buttock     Objective     Divya received therapeutic exercises to develop strength, ROM, flexibility and core stabilization for 15 minutes including:  Push/pull (R hip ext, L hip flex)3x3 sec  Modified piriformis stretch 3x20 sec  Supine HS stretch 5x10 sec   TA set x 15 with 3 second hold    Not performed today due to time:  Bridge 2 x 10  Supine march + TA set x 10 each leg, alternating  TA sets with uni knee fall out x10 ea  Clams x 20 (will add YTB next visit)  Mid row yellow tubing 2 x 10  Shoulder extension yellow tubing 2 x 10      May add: sit to stand, dry needling as needed    Pt rec'd manual therapy x 25 min to include:  STM to R glute medius and R piriformis  Pt cleared of contraindications and verbal and written consent acquired. Pt  given option of copy of consent form and given hard copy. Pt educated on benefits and potential side effects of DN. FDN performed to R glute medius (3 pts) and R piriformis (4 pts) with 75mm and winding for all. FDN performed to reduce pain and muscle tension, promote blood flow, and improve ROM and function. Pt tolerated tx well without adverse effects. Pt was educated on what to expect following the procedure and he verbalized understanding.     Pt rec'd moist heat to R buttock x10min for decreased muscle soreness.  Pt given call bell if too hot or uncomfortable.  Pt gave verbal understanding.    Home Exercises Provided and Patient Education Provided     Education provided:   - avoid ice and NSAIDs today and tomorrow after DN  -may use moist heat for decreased soreness and drink plenty of water  Pt gave verbal understanding to all education provided    Written Home Exercises Provided: Patient instructed to cont prior HEP.  Exercises were reviewed and Divya was able to demonstrate them prior to the end of the session.  Divya demonstrated good  understanding of the education provided.     See EMR under Patient Instructions for exercises provided 8/25/2020, 9/9/2020, 9/11/2020.    Assessment     Patient presented with R anterior innominate which corrected with push/pull.  She presented with continued tightness and pain to R glute medius and R piriformis, which both loosened significantly after dry needling.  Unable to progress exercises due to time after addition of dry needling and pt with increased pain level today.  She reported decreased overall pain post tx, just soreness to R glute.  She will continue to benefit from PT for DN and manual therapy as needed to loosen R glute and piriformis, and core/glute strengthening.  Divya is progressing well towards her goals.   Pt prognosis is Excellent.     Pt will continue to benefit from skilled outpatient physical therapy to address the deficits listed in the problem  list box on initial evaluation, provide pt/family education and to maximize pt's level of independence in the home and community environment.     Pt's spiritual, cultural and educational needs considered and pt agreeable to plan of care and goals.     Anticipated barriers to physical therapy: work schedule    Goals:   Short Term Goals:  4 weeks (in progress, not met)  1.Report decreased    Low back    pain  <   / =  4  /10 at its worst  to increase tolerance for ADLs and work activities.  2. Pt to increase B popliteal angle by > or = 5 degrees in order to improve flexibility and posture.   3. Increased R LE strength by 1/3 muscle grade in all deficient planes to increase tolerance for ADL and work activities.  4. Increased L LE strength by 1/3 muscle grade in all deficient planes to increase tolerance for ADL and work activities.  5. Pt will be independent with push/pull to achieve level pelvis for increased ease with job duties.  6. Pt to tolerate HEP to improve ROM and independence with ADL's  7. Pt will report ability to sleep through the night without back pain.  8. Pt will demonstrate understanding of proper body mechanics for lifting, pushing, and pulling for increased ease with job tasks.     Long Term Goals: 8 weeks (in progress, not met)  1.Report decreased    Low back    pain  <   / =  1  /10  to increase tolerance for ADLs  2.Pt to increase B popliteal angle by > or = 20 degrees in order to improve flexibility and posture.   3.Increased B LE strength to 4+/5 to 5/5 in all deficient planes to increase tolerance for ADL and work activities.  4. Pt will present with level pelvis and maintain it throughout session for increased ease with ADLs.  5.Pt will lift 10-15 box floor to/from waist with good body mechanics without pain for increased ease with job tasks.  6. Pt to be Independent with HEP to improve ROM and independence with ADL's    Plan     Continue PT towards established goals.  May add: sit to stand,  dry needling as needed    Jennifer Garcia, PT

## 2020-09-29 ENCOUNTER — OFFICE VISIT (OUTPATIENT)
Dept: SPINE | Facility: CLINIC | Age: 30
End: 2020-09-29
Payer: COMMERCIAL

## 2020-09-29 VITALS — HEIGHT: 62 IN | BODY MASS INDEX: 34.24 KG/M2 | WEIGHT: 186.06 LBS

## 2020-09-29 DIAGNOSIS — S39.012A STRAIN OF LUMBAR REGION, INITIAL ENCOUNTER: Primary | ICD-10-CM

## 2020-09-29 PROCEDURE — 99202 PR OFFICE/OUTPT VISIT, NEW, LEVL II, 15-29 MIN: ICD-10-PCS | Mod: S$GLB,,, | Performed by: PHYSICAL MEDICINE & REHABILITATION

## 2020-09-29 PROCEDURE — 3008F PR BODY MASS INDEX (BMI) DOCUMENTED: ICD-10-PCS | Mod: CPTII,S$GLB,, | Performed by: PHYSICAL MEDICINE & REHABILITATION

## 2020-09-29 PROCEDURE — 3008F BODY MASS INDEX DOCD: CPT | Mod: CPTII,S$GLB,, | Performed by: PHYSICAL MEDICINE & REHABILITATION

## 2020-09-29 PROCEDURE — 99202 OFFICE O/P NEW SF 15 MIN: CPT | Mod: S$GLB,,, | Performed by: PHYSICAL MEDICINE & REHABILITATION

## 2020-09-29 NOTE — PROGRESS NOTES
SUBJECTIVE:    Patient ID: Divya Wilson is a 30 y.o. female.    Chief Complaint: Follow-up (PT )    She presents for follow-up having completed 5/12 physical therapy sessions.  Overall she feels that things are improving.  Dry needling seems to have helped.  I note that she has some stressors at home.  She had beak warranted for 14 days due to a possible COVID exposure.  Turns out that her boyfriend tested negative.  She is going to go back to work tomorrow.  She has no new or progressive problems.  Pain level is 6/10        History reviewed. No pertinent past medical history.  Social History     Socioeconomic History    Marital status: Single     Spouse name: Not on file    Number of children: Not on file    Years of education: Not on file    Highest education level: Not on file   Occupational History    Not on file   Social Needs    Financial resource strain: Not on file    Food insecurity     Worry: Not on file     Inability: Not on file    Transportation needs     Medical: Not on file     Non-medical: Not on file   Tobacco Use    Smoking status: Never Smoker    Smokeless tobacco: Never Used   Substance and Sexual Activity    Alcohol use: Not on file    Drug use: Not on file    Sexual activity: Not on file   Lifestyle    Physical activity     Days per week: Not on file     Minutes per session: Not on file    Stress: Not on file   Relationships    Social connections     Talks on phone: Not on file     Gets together: Not on file     Attends [employer] service: Not on file     Active member of club or organization: Not on file     Attends meetings of clubs or organizations: Not on file     Relationship status: Not on file   Other Topics Concern    Not on file   Social History Narrative    Not on file     Past Surgical History:   Procedure Laterality Date    HYSTERECTOMY       Family History   Problem Relation Age of Onset    Cancer Maternal Grandmother      Vitals:    09/29/20 0947   Weight:  "84.4 kg (186 lb 1.1 oz)   Height: 5' 2" (1.575 m)       Review of Systems   Constitutional: Negative for chills, diaphoresis, fatigue, fever and unexpected weight change.   HENT: Negative for trouble swallowing.    Eyes: Negative for visual disturbance.   Respiratory: Negative for shortness of breath.    Cardiovascular: Negative for chest pain.   Gastrointestinal: Negative for abdominal pain, constipation, nausea and vomiting.   Genitourinary: Negative for difficulty urinating.   Musculoskeletal: Negative for arthralgias, back pain, gait problem, joint swelling, myalgias, neck pain and neck stiffness.   Neurological: Negative for dizziness, speech difficulty, weakness, light-headedness, numbness and headaches.          Objective:      Physical Exam  Neurological:      Mental Status: She is alert and oriented to person, place, and time.             Assessment:       1. Strain of lumbar region, initial encounter           Plan:     improving with physical therapy.  Continue current treatment.  Stressed the importance of home exercise program.  Follow-up at completion of therapy      Strain of lumbar region, initial encounter          "

## 2020-10-02 ENCOUNTER — CLINICAL SUPPORT (OUTPATIENT)
Dept: REHABILITATION | Facility: HOSPITAL | Age: 30
End: 2020-10-02
Payer: COMMERCIAL

## 2020-10-02 DIAGNOSIS — M62.81 MUSCLE WEAKNESS: ICD-10-CM

## 2020-10-02 DIAGNOSIS — R26.89 DECREASED FUNCTIONAL MOBILITY: ICD-10-CM

## 2020-10-02 PROCEDURE — 97140 MANUAL THERAPY 1/> REGIONS: CPT | Mod: PN,CQ

## 2020-10-02 PROCEDURE — 97110 THERAPEUTIC EXERCISES: CPT | Mod: PN,CQ

## 2020-10-02 NOTE — PROGRESS NOTES
Physical Therapy Treatment Note     Name: Divya Wilson  Clinic Number: [id number]    Therapy Diagnosis:   No diagnosis found.  Physician: Peter Gonzalez MD    Visit Date: 10/2/2020    Physician Orders: PT Eval and Treat   Medical Diagnosis from Referral: Strain of lumbar region, initial encounter  Evaluation Date: 8/25/2020  Authorization Period Expiration: 12/31/2020  Plan of Care Expiration: 10/23/2020  Visit # / Visits authorized: 6 / 12 (+1/1 from eval)    Time In: 1:10  Time Out: 2:01  Total Billable Time: 40 minutes    Precautions: Standard    Subjective     Pt reports: improvement of symptoms into the low back but still getting some pain due to being back at work as well as family stressors and having slept on an air mattress one night.  She was NOT compliant with home exercise program.  Response to previous treatment: muscle soreness, but felt looser  Functional change: able to do more reps of exercises with no increase of pain    Pain: 5.5/10   Location: R buttock     Objective     Divya received therapeutic exercises to develop strength, ROM, flexibility and core stabilization for 30 minutes including:  Push/pull (R hip ext, L hip flex)3x3 sec  Modified piriformis stretch 3x20 sec  Supine HS stretch 5x10 sec   TA set x 15 with 3 second hold  Supine march + TA set x 10 each leg, alternating  TA sets with uni knee fall out x10 ea - difficulty preventing lateral roll of pelvis  Bridge 2 x 10  Clams YTB 2 x 10 (push/pull between sides)  Push/pull again after exercise and after manual techniques    Not performed today due to time:  Mid row yellow tubing 2 x 10  Shoulder extension yellow tubing 2 x 10      May add: sit to stand    Pt rec'd manual therapy x 10 min to include:  STM to R glute medius and R piriformis    Pt rec'd cold pack to R buttock x10min for decreased pain muscle soreness.     Home Exercises Provided and Patient Education Provided     Education provided:   - may use moist heat or cold  pack for low back pain as needed  - continue to drink plenty of water  - emphasis on pain free motion with exercises  Pt gave verbal understanding to all education provided    Written Home Exercises Provided: Patient instructed to cont prior HEP.  Exercises were reviewed and Divya was able to demonstrate them prior to the end of the session.  Divya demonstrated good  understanding of the education provided.     See EMR under Patient Instructions for exercises provided 8/25/2020, 9/9/2020, 9/11/2020.    Assessment     Patient presents with R anterior innominate upon arrival which corrected with push/pull. Onset of dysfunction again with clamshells but corrected with push/pull and patient noted good understanding of need for push/pull due to pain at R buttock. Tightness and tenderness noted along the R sacral border and piriformis which alleviated somewhat following manual techniques and pt noted pain reduced to 4/10. Appears to respond better to dry needling, which will resume at next session. Pt will benefit from continued progression of strength as able.  Divya is progressing well towards her goals.   Pt prognosis is Excellent.     Pt will continue to benefit from skilled outpatient physical therapy to address the deficits listed in the problem list box on initial evaluation, provide pt/family education and to maximize pt's level of independence in the home and community environment.     Pt's spiritual, cultural and educational needs considered and pt agreeable to plan of care and goals.     Anticipated barriers to physical therapy: work schedule    Goals:   Short Term Goals:  4 weeks (in progress, not met)  1.Report decreased    Low back    pain  <   / =  4  /10 at its worst  to increase tolerance for ADLs and work activities.  2. Pt to increase B popliteal angle by > or = 5 degrees in order to improve flexibility and posture.   3. Increased R LE strength by 1/3 muscle grade in all deficient planes to increase  tolerance for ADL and work activities.  4. Increased L LE strength by 1/3 muscle grade in all deficient planes to increase tolerance for ADL and work activities.  5. Pt will be independent with push/pull to achieve level pelvis for increased ease with job duties.  6. Pt to tolerate HEP to improve ROM and independence with ADL's  7. Pt will report ability to sleep through the night without back pain.  8. Pt will demonstrate understanding of proper body mechanics for lifting, pushing, and pulling for increased ease with job tasks.     Long Term Goals: 8 weeks (in progress, not met)  1.Report decreased    Low back    pain  <   / =  1  /10  to increase tolerance for ADLs  2.Pt to increase B popliteal angle by > or = 20 degrees in order to improve flexibility and posture.   3.Increased B LE strength to 4+/5 to 5/5 in all deficient planes to increase tolerance for ADL and work activities.  4. Pt will present with level pelvis and maintain it throughout session for increased ease with ADLs.  5.Pt will lift 10-15 box floor to/from waist with good body mechanics without pain for increased ease with job tasks.  6. Pt to be Independent with HEP to improve ROM and independence with ADL's    Plan     Continue PT towards established goals.  May add: sit to stand, dry needling again as needed    Lisa Estes, PTA

## 2020-10-05 ENCOUNTER — CLINICAL SUPPORT (OUTPATIENT)
Dept: REHABILITATION | Facility: HOSPITAL | Age: 30
End: 2020-10-05
Payer: COMMERCIAL

## 2020-10-05 ENCOUNTER — DOCUMENTATION ONLY (OUTPATIENT)
Dept: REHABILITATION | Facility: HOSPITAL | Age: 30
End: 2020-10-05

## 2020-10-05 DIAGNOSIS — R26.89 DECREASED FUNCTIONAL MOBILITY: ICD-10-CM

## 2020-10-05 DIAGNOSIS — M62.81 MUSCLE WEAKNESS: ICD-10-CM

## 2020-10-05 PROCEDURE — 97140 MANUAL THERAPY 1/> REGIONS: CPT | Mod: PN

## 2020-10-05 PROCEDURE — 97530 THERAPEUTIC ACTIVITIES: CPT | Mod: PN,59

## 2020-10-05 PROCEDURE — 97110 THERAPEUTIC EXERCISES: CPT | Mod: PN

## 2020-10-05 NOTE — PROGRESS NOTES
Physical Therapy Treatment Note     Name: Divya Wilson  [facility] Number: [id number]    Therapy Diagnosis:   Encounter Diagnoses   Name Primary?    Muscle weakness     Decreased functional mobility      Physician: Peter Gonzalez MD    Visit Date: 10/5/2020    Physician Orders: PT Eval and Treat   Medical Diagnosis from Referral: Strain of lumbar region, initial encounter  Evaluation Date: 8/25/2020  Authorization Period Expiration: 12/31/2020  Plan of Care Expiration: 10/23/2020 (reassessed on 10/5/20)  Visit # / Visits authorized: 7 / 12 (+1/1 from eval)    Time In: 1:10  Time Out: 2:01  Total Billable Time: 40 minutes    Precautions: Standard    Subjective     Pt reports: pt states she felt better after DN--buttock felt looser, but has tightened up again.  Pt states she felt better after her last tx until the next day when she was at work.  She was compliant with home exercise program.  Response to previous treatment: muscle soreness, but felt looser  Functional change: able to sleep through the night without pain  Pain: 7.5/10   Location: R buttock     Objective     Posture:  Fwd head, rounded shoudlers   L sh and iliac crest elevated in standing  Supine: R med malleolus more inferior, R ASIS more inferior    Lumbar Range of Motion:    % Pain   Flexion 80   no        Extension 50   stiffness        Left Side Bending 90 no        Right Side Bending 90 Pain to R low back        Left rotation   50 no        Right Rotation   50 Pain to R low back             Lower Extremity Strength  Right LE  Left LE    Knee extension: 5/5 Knee extension: 5/5   Knee flexion: 5/5 Knee flexion: 5/5   Hip flexion: 5/5 Hip flexion: 5/5   Hip extension:  4/5 pain Hip extension: 4+/5   Hip abduction: 4/5 Hip abduction: 4+/5   Hip adduction: 5/5 Hip adduction 5/5   Ankle dorsiflexion: 5/5 Ankle dorsiflexion: 5/5   Ankle plantarflexion: 4+/5 Ankle plantarflexion: 4+/5       Neuro Dynamic Testing:    Sciatic nerve:      SLR:(-)  B     Palpation: TTP to B glute medius, R piriformis    Sensation: grossly intact to light touch    Flexibility:    Popliteal Angle: R = -20 degrees ; L = -20 degrees      Divya received therapeutic exercises to develop strength, ROM, flexibility and core stabilization for 20 minutes including:  Reassessment  Push/pull (R hip ext, L hip flex)3x3 sec  Modified piriformis stretch 3x20 sec  Supine HS stretch 5x10 sec   TA sets with uni knee fall out x10 ea - improved control and decreased pelvic rocking today  Push/pull again after exercise and after manual techniques    Pt rec'd therapeutic activities x 10 min including:  Squat lift floor to/from waist orange tball x5  Weight shift to pull object off waist height shelf and pivot to turn and carry object x 3    May add: sit to stand, standing sh ext + TA set, pall press, mid rows + TA set    Not performed today due to time:  Mid row yellow tubing 2 x 10  Shoulder extension yellow tubing 2 x 10   Bridge 2 x 10  Clams YTB 2 x 10 (push/pull between sides)  Supine march + TA set x 10 each leg, alternating       Pt rec'd manual therapy x 10 min to include:  Pt cleared of contraindications and verbal and written consent acquired. Pt given option of copy of consent form. Pt educated on benefits and potential side effects of DN. FDN performed to R piriformis (4 pts) and R glute medius (3 pts)  75mm and winding for all. FDN performed to reduce pain and muscle tension, promote blood flow, and improve ROM and function x 10 minutes. Pt tolerated tx well without adverse effects. Pt was educated on what to expect following the procedure and he verbalized understanding.     Home Exercises Provided and Patient Education Provided     Education provided:   - avoid ice and NSAIDs today and tomorrow after DN  - continue to drink plenty of water  - emphasis on pain free motion with exercises  Pt gave verbal understanding to all education provided    Written Home Exercises Provided:  Patient instructed to cont prior HEP.  Exercises were reviewed and Divya was able to demonstrate them prior to the end of the session.  Divya demonstrated good  understanding of the education provided.     See EMR under Patient Instructions for exercises provided 8/25/2020, 9/9/2020, 9/11/2020.    Assessment     Patient presented with R anterior innominate, which corrected easily with push/pull.  Pt reassessed today.  She has improved significantly with increased lumbar ROM, LE strength, and improved sleep.  She continues to have decreased lumbar ROM, decreased hip strength, and increased R piriformis and glute medius tightness.  Pt did report dizziness after sitting up from prone for DN, but it subsided after sitting for 5 minutes. She will continue to benefit from skilled PT for manual and dry needling as needed and core/glute strengthening for improved functional mobility.  Divya is progressing well towards her goals.   Pt prognosis is Excellent.     Pt will continue to benefit from skilled outpatient physical therapy to address the deficits listed in the problem list box on initial evaluation, provide pt/family education and to maximize pt's level of independence in the home and community environment.     Pt's spiritual, cultural and educational needs considered and pt agreeable to plan of care and goals.     Anticipated barriers to physical therapy: work schedule    Goals:   Short Term Goals:  4 weeks   1.Report decreased    Low back    pain  <   / =  4  /10 at its worst  to increase tolerance for ADLs and work activities. (progressing)  2. Pt to increase B popliteal angle by > or = 5 degrees in order to improve flexibility and posture. (progressing)  3. Increased R LE strength by 1/3 muscle grade in all deficient planes to increase tolerance for ADL and work activities. (met)  4. Increased L LE strength by 1/3 muscle grade in all deficient planes to increase tolerance for ADL and work activities. (met)  5.  Pt will be independent with push/pull to achieve level pelvis for increased ease with job duties. (met)  6. Pt to tolerate HEP to improve ROM and independence with ADL's (met)  7. Pt will report ability to sleep through the night without back pain. (met)  8. Pt will demonstrate understanding of proper body mechanics for lifting, pushing, and pulling for increased ease with job tasks. (met)     Long Term Goals: 8 weeks (in progress, not met)  1.Report decreased    Low back    pain  <   / =  1  /10  to increase tolerance for ADLs  2.Pt to increase B popliteal angle by > or = 20 degrees in order to improve flexibility and posture.   3.Increased B LE strength to 4+/5 to 5/5 in all deficient planes to increase tolerance for ADL and work activities.  4. Pt will present with level pelvis and maintain it throughout session for increased ease with ADLs.  5.Pt will lift 10-15 box floor to/from waist with good body mechanics without pain for increased ease with job tasks.  6. Pt to be Independent with HEP to improve ROM and independence with ADL's    Unmet goals remain appropriate within 4 weeks.    Plan     Continue PT towards established goals.  May add: sit to stand, standing sh ext + TA set, pall press, mid rows + TA set    Jennifer Garcia, PT

## 2020-10-09 ENCOUNTER — CLINICAL SUPPORT (OUTPATIENT)
Dept: REHABILITATION | Facility: HOSPITAL | Age: 30
End: 2020-10-09
Payer: COMMERCIAL

## 2020-10-09 DIAGNOSIS — M62.81 MUSCLE WEAKNESS: ICD-10-CM

## 2020-10-09 DIAGNOSIS — R26.89 DECREASED FUNCTIONAL MOBILITY: ICD-10-CM

## 2020-10-09 PROCEDURE — 97110 THERAPEUTIC EXERCISES: CPT | Mod: PN,CQ

## 2020-10-09 PROCEDURE — 97140 MANUAL THERAPY 1/> REGIONS: CPT | Mod: PN,CQ

## 2020-10-09 NOTE — PROGRESS NOTES
Physical Therapy Treatment Note     Name: Divya Wilson  Clinic Number: [id number]    Therapy Diagnosis:   Encounter Diagnoses   Name Primary?    Muscle weakness     Decreased functional mobility      Physician: Peter Gonzalez MD    Visit Date: 10/9/2020    Physician Orders: PT Eval and Treat   Medical Diagnosis from Referral: Strain of lumbar region, initial encounter  Evaluation Date: 8/25/2020  Authorization Period Expiration: 12/31/2020  Plan of Care Expiration: 10/23/2020 (reassessed on 10/5/20)  Visit # / Visits authorized: 8 / 12 (+1/1 from eval)    Time In: 9:19  Time Out: 10:13  Total Billable Time: 40 minutes    Precautions: Standard    Subjective     Pt reports: still having aching along the sacral border but has not been having the stabbing pain that she has felt lately. Felt less tight after her last session but is working about 10 hour days which is definitely contributing to her tightness  She was compliant with home exercise program.  Response to previous treatment: muscle soreness, but felt looser  Functional change: can work a little longer before fatigue sets in  Pain: 5/10 (was a 6 getting up this morning)  Location: R buttock     Objective     Dviya received therapeutic exercises to develop strength, ROM, flexibility and core stabilization for 22 minutes including:  Push/pull (R hip ext, L hip flex)3x3 sec  Modified piriformis stretch 3x20 sec  Supine HS stretch 5x10 sec   TA sets with YTB uni knee fall out 2x10 ea - improved control and decreased pelvic rocking today  Bridge 2 x 10  Standing B shld ext + TA set x10 yellow tubing- difficulty avoiding anterior tilt  Push/pull again after exercise and after manual techniques    Pt rec'd therapeutic activities x 10 min including:  Squat lift floor to/from waist orange tball x10  Weight shift to pull object off waist height shelf and pivot to turn and carry object x 5    May add: sit to stand, pall press, mid rows + TA set    Not  performed today due to time:  Mid row yellow tubing 2 x 10  Clams YTB 2 x 10 (push/pull between sides)  Supine march + TA set x 10 each leg, alternating     Pt rec'd manual therapy x 8 min to include:  STM along the sacral border and R Piriformis in L sidelying    Pt received moist heat x 10 minutes in prone position with pillow under hips or support, push/pull post treatment prior to leaving.      Home Exercises Provided and Patient Education Provided     Education provided:   - avoid ice and NSAIDs today and tomorrow after DN  - continue to drink plenty of water  - emphasis on pain free motion with exercises  Pt gave verbal understanding to all education provided    Written Home Exercises Provided: Patient instructed to cont prior HEP.  Exercises were reviewed and Divya was able to demonstrate them prior to the end of the session.  Divya demonstrated good  understanding of the education provided.     See EMR under Patient Instructions for exercises provided 8/25/2020, 9/9/2020, 9/11/2020.    Assessment     Patient presented with slight R anterior innominate, which corrected easily with push/pull. Patient is able to complete exercises listed above without exacerbation of symptoms, but noted fatigue into the LEs and core. Difficulty avoiding lumbar extensor recruitment with floor to waist lifts, required frequent cues for posterior weight shift and TA activation. Tightness and tenderness continues along the R piriformis and glut med. Appears to respond better to DN than STM. She will continue to benefit from skilled PT for manual and dry needling as needed and core/glute strengthening for improved functional mobility.  Divya is progressing well towards her goals.   Pt prognosis is Excellent.     Pt will continue to benefit from skilled outpatient physical therapy to address the deficits listed in the problem list box on initial evaluation, provide pt/family education and to maximize pt's level of independence in  the home and community environment.     Pt's spiritual, cultural and educational needs considered and pt agreeable to plan of care and goals.     Anticipated barriers to physical therapy: work schedule    Goals:   Short Term Goals:  4 weeks   1.Report decreased    Low back    pain  <   / =  4  /10 at its worst  to increase tolerance for ADLs and work activities. (progressing)  2. Pt to increase B popliteal angle by > or = 5 degrees in order to improve flexibility and posture. (progressing)  3. Increased R LE strength by 1/3 muscle grade in all deficient planes to increase tolerance for ADL and work activities. (met)  4. Increased L LE strength by 1/3 muscle grade in all deficient planes to increase tolerance for ADL and work activities. (met)  5. Pt will be independent with push/pull to achieve level pelvis for increased ease with job duties. (met)  6. Pt to tolerate HEP to improve ROM and independence with ADL's (met)  7. Pt will report ability to sleep through the night without back pain. (met)  8. Pt will demonstrate understanding of proper body mechanics for lifting, pushing, and pulling for increased ease with job tasks. (met)     Long Term Goals: 8 weeks (in progress, not met)  1.Report decreased    Low back    pain  <   / =  1  /10  to increase tolerance for ADLs  2.Pt to increase B popliteal angle by > or = 20 degrees in order to improve flexibility and posture.   3.Increased B LE strength to 4+/5 to 5/5 in all deficient planes to increase tolerance for ADL and work activities.  4. Pt will present with level pelvis and maintain it throughout session for increased ease with ADLs.  5.Pt will lift 10-15 box floor to/from waist with good body mechanics without pain for increased ease with job tasks.  6. Pt to be Independent with HEP to improve ROM and independence with ADL's    Unmet goals remain appropriate within 4 weeks.    Plan     Continue PT towards established goals.  May add: sit to stand, pall press,  mid rows + TA set    Lias Estes, PTA

## 2020-10-13 ENCOUNTER — CLINICAL SUPPORT (OUTPATIENT)
Dept: REHABILITATION | Facility: HOSPITAL | Age: 30
End: 2020-10-13
Payer: COMMERCIAL

## 2020-10-13 DIAGNOSIS — M62.81 MUSCLE WEAKNESS: ICD-10-CM

## 2020-10-13 DIAGNOSIS — R26.89 DECREASED FUNCTIONAL MOBILITY: ICD-10-CM

## 2020-10-13 PROCEDURE — 97110 THERAPEUTIC EXERCISES: CPT | Mod: PN

## 2020-10-13 PROCEDURE — 97140 MANUAL THERAPY 1/> REGIONS: CPT | Mod: PN

## 2020-10-13 NOTE — PROGRESS NOTES
Physical Therapy Treatment Note     Name: Divya Wilson  Clinic Number: [id number]    Therapy Diagnosis:   Encounter Diagnoses   Name Primary?    Muscle weakness     Decreased functional mobility      Physician: Peter Gonzalez MD    Visit Date: 10/13/2020    Physician Orders: PT Eval and Treat   Medical Diagnosis from Referral: Strain of lumbar region, initial encounter  Evaluation Date: 8/25/2020  Authorization Period Expiration: 12/31/2020  Plan of Care Expiration: 10/23/2020 (reassessed on 10/5/20)  Visit # / Visits authorized: 9 / 12 (+1/1 from eval)    Time In: 8:35  Time Out: 9:25  Total Billable Time: 50 minutes    Precautions: Standard    Subjective     Pt reports: pt states she is feeling better overall.  She states she does not have intense pain until the end of the day.  She states she does have soreness throughout the day, but her R buttock muscles feel looser.  She was compliant with home exercise program.  Response to previous treatment: muscle soreness, but felt looser  Functional change: can work a little longer before fatigue sets in  Pain: 5/10   Location: R buttock     Objective     Divya received therapeutic exercises to develop strength, ROM, flexibility and core stabilization for 25 minutes including:  Push/pull (R hip ext, L hip flex)3x3 sec  Modified piriformis stretch 3x20 sec  Supine HS stretch 6x10 sec   TA sets with heel slides x10  Bridge 2 x 10, 5 sec hold  Standing B shld ext + TA set 2x10 yellow tubing  Sit to stand from 18in block with TA set and glute set x10  Push/pull again after exercise and after manual techniques      May add: pall press, mid rows + TA set    Pt rec'd manual therapy x 20 min to include:  STM and pin and stretch to R piriformis in prone (pillow under hips)  Pt cleared of contraindications and verbal and written consent acquired. Pt given option of copy of consent form. Pt educated on benefits and potential side effects of DN. FDN performed to R glute  medius (3 pts)  75mm and winding for all. FDN performed to reduce pain and muscle tension, promote blood flow, and improve ROM and function x 10 minutes. Pt tolerated tx well without adverse effects. Pt was educated on what to expect following the procedure and he verbalized understanding.     Pt received moist heat x 10 minutes in prone position with pillow under hips or support, push/pull post treatment prior to leaving.      Home Exercises Provided and Patient Education Provided     Education provided:   - avoid ice and NSAIDs today and tomorrow after DN  - continue to drink plenty of water  - emphasis on pain free motion with exercises  Pt gave verbal understanding to all education provided    Written Home Exercises Provided: Patient instructed to cont prior HEP.  Exercises were reviewed and Divya was able to demonstrate them prior to the end of the session.  Divya demonstrated good  understanding of the education provided.     See EMR under Patient Instructions for exercises provided 8/25/2020, 9/9/2020, 9/11/2020.    Assessment     Patient presented with some tightness to R piriformis muscle belly, which loosened with manual therapy.  She had some tightness to R glute medius after exercises, which loosened with dry needling.  She was able to tolerate increased core challenges without exacerbation of pain.  She reported decreased R low back/buttock pain to 4/10 post tx.  Divya is progressing well towards her goals.   Pt prognosis is Excellent.     Pt will continue to benefit from skilled outpatient physical therapy to address the deficits listed in the problem list box on initial evaluation, provide pt/family education and to maximize pt's level of independence in the home and community environment.     Pt's spiritual, cultural and educational needs considered and pt agreeable to plan of care and goals.     Anticipated barriers to physical therapy: work schedule    Goals:   Short Term Goals:  4 weeks    1.Report decreased    Low back    pain  <   / =  4  /10 at its worst  to increase tolerance for ADLs and work activities. (progressing)  2. Pt to increase B popliteal angle by > or = 5 degrees in order to improve flexibility and posture. (progressing)  3. Increased R LE strength by 1/3 muscle grade in all deficient planes to increase tolerance for ADL and work activities. (met)  4. Increased L LE strength by 1/3 muscle grade in all deficient planes to increase tolerance for ADL and work activities. (met)  5. Pt will be independent with push/pull to achieve level pelvis for increased ease with job duties. (met)  6. Pt to tolerate HEP to improve ROM and independence with ADL's (met)  7. Pt will report ability to sleep through the night without back pain. (met)  8. Pt will demonstrate understanding of proper body mechanics for lifting, pushing, and pulling for increased ease with job tasks. (met)     Long Term Goals: 8 weeks (in progress, not met)  1.Report decreased    Low back    pain  <   / =  1  /10  to increase tolerance for ADLs  2.Pt to increase B popliteal angle by > or = 20 degrees in order to improve flexibility and posture.   3.Increased B LE strength to 4+/5 to 5/5 in all deficient planes to increase tolerance for ADL and work activities.  4. Pt will present with level pelvis and maintain it throughout session for increased ease with ADLs.  5.Pt will lift 10-15 box floor to/from waist with good body mechanics without pain for increased ease with job tasks.  6. Pt to be Independent with HEP to improve ROM and independence with ADL's      Plan     Continue PT towards established goals.  May add: pall press, mid rows + TA set    Jennifer Garcia, PT

## 2020-10-16 ENCOUNTER — CLINICAL SUPPORT (OUTPATIENT)
Dept: REHABILITATION | Facility: HOSPITAL | Age: 30
End: 2020-10-16
Payer: COMMERCIAL

## 2020-10-16 DIAGNOSIS — M62.81 MUSCLE WEAKNESS: ICD-10-CM

## 2020-10-16 DIAGNOSIS — R26.89 DECREASED FUNCTIONAL MOBILITY: ICD-10-CM

## 2020-10-16 PROCEDURE — 97140 MANUAL THERAPY 1/> REGIONS: CPT | Mod: PN,CQ

## 2020-10-16 PROCEDURE — 97110 THERAPEUTIC EXERCISES: CPT | Mod: PN,CQ

## 2020-10-16 NOTE — PROGRESS NOTES
Physical Therapy Treatment Note     Name: Divya Wilson  Clinic Number: [id number]    Therapy Diagnosis:   Encounter Diagnoses   Name Primary?    Muscle weakness     Decreased functional mobility      Physician: Peter Gonzalez MD    Visit Date: 10/16/2020    Physician Orders: PT Eval and Treat   Medical Diagnosis from Referral: Strain of lumbar region, initial encounter  Evaluation Date: 8/25/2020  Authorization Period Expiration: 12/31/2020  Plan of Care Expiration: 10/23/2020 (reassessed on 10/5/20)  Visit # / Visits authorized: 10 / 12 (+1/1 from eval)    Time In: 2:32  Time Out: 3:17  Total Billable Time: 45 minutes    Precautions: Standard    Subjective     Pt reports: overall doing better, less pain overall for the amount she has been working. Soreness rather than sharp pain along the low back R > L. Knows she needs to sleep a bit more to heal more as well.  She was compliant with home exercise program.  Response to previous treatment: muscle soreness, but felt looser  Functional change: can do a little more at work  Pain: 4.5/10   Location: R buttock     Objective     Divya received therapeutic exercises to develop strength, ROM, flexibility and core stabilization for 25 minutes including:  Push/pull (R hip ext, L hip flex)3x3 sec  Modified piriformis stretch 3x20 sec  Supine HS stretch 6x10 sec   TA sets with heel slides x10  Bridge 2 x 10, 5 sec hold  Prone glut set  Prone hip ext x 10 (followed by push/pull to ensure good alignment)  Standing B shld ext + TA set 2x10 yellow tubing  Mid-row + TA set 2x10  Blue tubing  Sit to stand from 18in block with TA set and glute set x10  Push/pull again after exercise and after manual techniques      May add: Pallof press + TA set x10 cw/ccw blue tubing (single handle)     Pt rec'd manual therapy x 10 min to include:  STM along B glut med and piriformis,  pin and stretch to R piriformis in prone (folded pillow under hips)    Pt deferred: Pt received moist  heat x 0 minutes in prone position with pillow under hips or support, push/pull post treatment prior to leaving.      Home Exercises Provided and Patient Education Provided     Education provided:   - avoid ice and NSAIDs today and tomorrow after DN  - continue to drink plenty of water  - emphasis on pain free motion with exercises  Pt gave verbal understanding to all education provided    Written Home Exercises Provided: Patient instructed to cont prior HEP.  Exercises were reviewed and Divya was able to demonstrate them prior to the end of the session.  Divya demonstrated good  understanding of the education provided.     See EMR under Patient Instructions for exercises provided 8/25/2020, 9/9/2020, 9/11/2020.    Assessment     Patient overall feeling better and responding well to treatments. Pain overall has decreased pain and core and LE strength continues to improve. Some tightness bilaterally along glut med and piriformis, more proximal to sacrum, all of which loosened with manual therapy. She reported decreased R low back/buttock pain to 3-3.5/10 post tx.  Divya is progressing well towards her goals.   Pt prognosis is Excellent.     Pt will continue to benefit from skilled outpatient physical therapy to address the deficits listed in the problem list box on initial evaluation, provide pt/family education and to maximize pt's level of independence in the home and community environment.     Pt's spiritual, cultural and educational needs considered and pt agreeable to plan of care and goals.     Anticipated barriers to physical therapy: work schedule    Goals:   Short Term Goals:  4 weeks   1.Report decreased    Low back    pain  <   / =  4  /10 at its worst  to increase tolerance for ADLs and work activities. (progressing)  2. Pt to increase B popliteal angle by > or = 5 degrees in order to improve flexibility and posture. (progressing)  3. Increased R LE strength by 1/3 muscle grade in all deficient planes  to increase tolerance for ADL and work activities. (met)  4. Increased L LE strength by 1/3 muscle grade in all deficient planes to increase tolerance for ADL and work activities. (met)  5. Pt will be independent with push/pull to achieve level pelvis for increased ease with job duties. (met)  6. Pt to tolerate HEP to improve ROM and independence with ADL's (met)  7. Pt will report ability to sleep through the night without back pain. (met)  8. Pt will demonstrate understanding of proper body mechanics for lifting, pushing, and pulling for increased ease with job tasks. (met)     Long Term Goals: 8 weeks (in progress, not met)  1.Report decreased    Low back    pain  <   / =  1  /10  to increase tolerance for ADLs  2.Pt to increase B popliteal angle by > or = 20 degrees in order to improve flexibility and posture.   3.Increased B LE strength to 4+/5 to 5/5 in all deficient planes to increase tolerance for ADL and work activities.  4. Pt will present with level pelvis and maintain it throughout session for increased ease with ADLs.  5.Pt will lift 10-15 box floor to/from waist with good body mechanics without pain for increased ease with job tasks.  6. Pt to be Independent with HEP to improve ROM and independence with ADL's      Plan     Continue PT towards established goals.  May add: Pallof press + TA set x10 cw/ccw blue tubing (single handle)     Lisa Estes PTA

## 2020-10-20 ENCOUNTER — CLINICAL SUPPORT (OUTPATIENT)
Dept: REHABILITATION | Facility: HOSPITAL | Age: 30
End: 2020-10-20
Payer: COMMERCIAL

## 2020-10-20 DIAGNOSIS — R26.89 DECREASED FUNCTIONAL MOBILITY: ICD-10-CM

## 2020-10-20 DIAGNOSIS — M62.81 MUSCLE WEAKNESS: ICD-10-CM

## 2020-10-20 PROCEDURE — 97110 THERAPEUTIC EXERCISES: CPT | Mod: PN

## 2020-10-20 PROCEDURE — 97140 MANUAL THERAPY 1/> REGIONS: CPT | Mod: PN

## 2020-10-20 NOTE — PROGRESS NOTES
Physical Therapy Treatment Note     Name: Divya Wilson  Clinic Number: [id number]    Therapy Diagnosis:   Encounter Diagnoses   Name Primary?    Muscle weakness     Decreased functional mobility      Physician: Peter Gonzalez MD    Visit Date: 10/20/2020    Physician Orders: PT Eval and Treat   Medical Diagnosis from Referral: Strain of lumbar region, initial encounter  Evaluation Date: 8/25/2020  Authorization Period Expiration: 12/31/2020  Plan of Care Expiration: 10/23/2020 (reassessed on 10/5/20)  Visit # / Visits authorized: 10 / 12 (+1/1 from eval)    Time In: 8:30  Time Out: 9:25  Total Billable Time: 45 minutes    Precautions: Standard    Subjective     Pt reports: she's been working 7 days straight so she has more soreness/pain to low back today.  She states her pain is more centralized to sacral area    She was compliant with home exercise program.  Response to previous treatment: muscle soreness, but felt looser  Functional change: can do a little more at work  Pain: 5/10   Location: R buttock     Objective     Divya received therapeutic exercises to develop strength, ROM, flexibility and core stabilization for 25 minutes including:  Push/pull (R hip ext, L hip flex)3x3 sec  Modified piriformis stretch 3x20 sec  Supine HS stretch 6x10 sec   Standing B shld ext + TA set 2x10 yellow tubing  Squat + W row 1x10  YTB  Pall of press x10  Sit to stand from 18in block with TA set and glute set x10  Push/pull again after exercise and after manual techniques    May add:  TA set + cw/ccw blue tubing (single handle)     Not performed due to time:  TA sets with heel slides x10  Bridge 2 x 10, 5 sec hold  Prone glut set  Prone hip ext x 10 (followed by push/pull to ensure good alignment)      Pt rec'd manual therapy x 20 min to include:  STM along B glut med and piriformis,  pin and stretch to R piriformis in prone (folded pillow under hips)  Pt cleared of contraindications and verbal and written consent  acquired. Pt given option of copy of consent form. Pt educated on benefits and potential side effects of DN. FDN performed to B glute medius (3 pts ea) and 1 pt to R proximal piriformis (75mm needles for all, winding for all). FDN performed to reduce pain and muscle tension, promote blood flow, and improve ROM and function. Pt tolerated tx well without adverse effects. Pt was educated on what to expect following the procedure and he verbalized understanding.     Pt received moist heat x 10 minutes in prone position with pillow under hips or support, push/pull post treatment prior to leaving.      Home Exercises Provided and Patient Education Provided     Education provided:   - avoid ice and NSAIDs today and tomorrow after DN  - continue to drink plenty of water  - emphasis on pain free motion with exercises  Pt gave verbal understanding to all education provided    Written Home Exercises Provided: Patient instructed to cont prior HEP.  Exercises were reviewed and Divya was able to demonstrate them prior to the end of the session.  Divya demonstrated good  understanding of the education provided.     See EMR under Patient Instructions for exercises provided 8/25/2020, 9/9/2020, 9/11/2020.    Assessment     Patient presented with pain more centralized to sacral area, but still some tightness to B glute medius which loosened with manual and dry needling.  Pt was able to tolerate increased core challenges.  She reported decreased pain to 4/10 post tx.  Divya is progressing well towards her goals.   Pt prognosis is Excellent.     Pt will continue to benefit from skilled outpatient physical therapy to address the deficits listed in the problem list box on initial evaluation, provide pt/family education and to maximize pt's level of independence in the home and community environment.     Pt's spiritual, cultural and educational needs considered and pt agreeable to plan of care and goals.     Anticipated barriers to  physical therapy: work schedule    Goals:   Short Term Goals:  4 weeks   1.Report decreased    Low back    pain  <   / =  4  /10 at its worst  to increase tolerance for ADLs and work activities. (progressing)  2. Pt to increase B popliteal angle by > or = 5 degrees in order to improve flexibility and posture. (progressing)  3. Increased R LE strength by 1/3 muscle grade in all deficient planes to increase tolerance for ADL and work activities. (met)  4. Increased L LE strength by 1/3 muscle grade in all deficient planes to increase tolerance for ADL and work activities. (met)  5. Pt will be independent with push/pull to achieve level pelvis for increased ease with job duties. (met)  6. Pt to tolerate HEP to improve ROM and independence with ADL's (met)  7. Pt will report ability to sleep through the night without back pain. (met)  8. Pt will demonstrate understanding of proper body mechanics for lifting, pushing, and pulling for increased ease with job tasks. (met)     Long Term Goals: 8 weeks (in progress, not met)  1.Report decreased    Low back    pain  <   / =  1  /10  to increase tolerance for ADLs  2.Pt to increase B popliteal angle by > or = 20 degrees in order to improve flexibility and posture.   3.Increased B LE strength to 4+/5 to 5/5 in all deficient planes to increase tolerance for ADL and work activities.  4. Pt will present with level pelvis and maintain it throughout session for increased ease with ADLs.  5.Pt will lift 10-15 box floor to/from waist with good body mechanics without pain for increased ease with job tasks.  6. Pt to be Independent with HEP to improve ROM and independence with ADL's      Plan     Continue PT towards established goals.  May add: TA set + cw/ccw blue tubing (single handle)     Jennifer Garcia, PT

## 2020-10-27 ENCOUNTER — CLINICAL SUPPORT (OUTPATIENT)
Dept: REHABILITATION | Facility: HOSPITAL | Age: 30
End: 2020-10-27
Payer: COMMERCIAL

## 2020-10-27 DIAGNOSIS — M62.81 MUSCLE WEAKNESS: ICD-10-CM

## 2020-10-27 DIAGNOSIS — R26.89 DECREASED FUNCTIONAL MOBILITY: ICD-10-CM

## 2020-10-27 PROCEDURE — 97110 THERAPEUTIC EXERCISES: CPT | Mod: PN

## 2020-10-27 PROCEDURE — 97140 MANUAL THERAPY 1/> REGIONS: CPT | Mod: PN

## 2020-10-27 NOTE — PLAN OF CARE
Updated Plan of Care and Physical Therapy Treatment Note     Name: Divya Wilson  Clinic Number: [id number]    Therapy Diagnosis:   No diagnosis found.  Physician: Peter Gonzalez MD    Visit Date: 10/27/2020    Physician Orders: PT Eval and Treat   Medical Diagnosis from Referral: Strain of lumbar region, initial encounter  Evaluation Date: 8/25/2020  Authorization Period Expiration: 12/31/2020  Plan of Care Expiration: 11/27/2020 (reassessed on 10/27/20)  Visit # / Visits authorized: 11 / 12 (+1/1 from eval)    Time In: 8:35  Time Out: 9:25  Total Billable Time: 45 minutes    Precautions: Standard    Subjective     Pt reports: she has pain and spasms last Wednesday.  She states the pain and spasms decreased with muscle relaxors.  She has been alternating ice and heat.  Pt states she missed her appt on Friday due to vomitting.  She states she forgot to eat with the muscle relaxer and it upset her stomach.  Pt states her pain is more to her lateral B buttock today.  She was compliant with home exercise program.  Response to previous treatment: muscle spasms to buttock and lumbar region--ease with muscle relaxors  Functional change: was able to work   Pain: 5/10   Location: R buttock     Objective     Posture:  Supine: R med malleolus more inferior, R ASIS more inferior    Lower Extremity Strength  Right LE  Left LE    Knee extension: 5/5 Knee extension: 5/5   Knee flexion: 5/5 Knee flexion: 5/5   Hip flexion: 4+/5 Hip flexion: 5/5   Hip extension:  4+/5  Hip extension: 4/5   Hip abduction: 4+/5 Hip abduction: 4+/5   Hip adduction: 5/5 Hip adduction 5/5   Ankle dorsiflexion: 5/5 Ankle dorsiflexion: 5/5   Ankle plantarflexion: 4+/5 Ankle plantarflexion: 4+/5       Neuro Dynamic Testing:    Sciatic nerve:      SLR:(-) B     Palpation: B lateral glute medius    Sensation: grossly intact to light touch    Flexibility:    Popliteal Angle: R = -20 degrees ; L = -10 degrees      Pt rec'd manual therapy x 15 min to  include:  STM along B glut med and piriformis  Strain counterstrain to B glute medius    Divya received therapeutic exercises to develop strength, ROM, flexibility and core stabilization for 30 minutes including:  Push/pull (R hip ext, L hip flex)3x3 sec  Modified piriformis stretch 3x20 sec  Supine HS stretch 6x10 sec   Standing B shld ext + TA set 2x10 blue tubing  Squat + W row 1x10  YTB  Pall of press (circles away from anchor) x10 ea  TA set + squat to  5# kettle bell from 12 in box x10  Sit to stand from 18in block with TA set and glute set x10 (hands in lap)  Push/pull again after exercise and after manual techniques    May add:  Progress squat lifts as tolerated, hip abd walk, sidelying shuttle    Not performed due to time:  TA sets with heel slides x10  Bridge 2 x 10, 5 sec hold  Prone glut set  Prone hip ext x 10 (followed by push/pull to ensure good alignment)      Pt received moist heat x 10 minutes in prone position with pillow under hips or support, push/pull post treatment prior to leaving.      Home Exercises Provided and Patient Education Provided     Education provided:   - avoid ice and NSAIDs today and tomorrow after DN  - continue to drink plenty of water  - emphasis on pain free motion with exercises  Pt gave verbal understanding to all education provided    Written Home Exercises Provided: Patient instructed to cont prior HEP.  Exercises were reviewed and Divya was able to demonstrate them prior to the end of the session.  Divya demonstrated good  understanding of the education provided.     See EMR under Patient Instructions for exercises provided 8/25/2020, 9/9/2020, 9/11/2020.    Assessment     Patient presented with some tightness to lateral glute medius B, which loosened with manual therapy.  Pt reassessed today and she has improved with increased LE strength and hamstring length.  She is progressing with demonstrating proper form for squat lifts and able to lift heavy weights  without back pain.  She had a flare up with muscle spasms after last visit last week, but it calmed down with use of muscle relaxors.  Did not perform dry needling today due to flare up after last session and glute medius loosened after strain counterstrain.  She will continue to benefit from PT for progressive core/lumbar/glute strengthening and increased functional mobility.  Divya is progressing well towards her goals.   Pt prognosis is Excellent.     Pt will continue to benefit from skilled outpatient physical therapy to address the deficits listed in the problem list box on initial evaluation, provide pt/family education and to maximize pt's level of independence in the home and community environment.     Pt's spiritual, cultural and educational needs considered and pt agreeable to plan of care and goals.     Anticipated barriers to physical therapy: work schedule    Goals:   Short Term Goals:  4 weeks   1.Report decreased    Low back    pain  <   / =  4  /10 at its worst  to increase tolerance for ADLs and work activities. (progressing)  2. Pt to increase B popliteal angle by > or = 5 degrees in order to improve flexibility and posture. (progressing)  3. Increased R LE strength by 1/3 muscle grade in all deficient planes to increase tolerance for ADL and work activities. (met)  4. Increased L LE strength by 1/3 muscle grade in all deficient planes to increase tolerance for ADL and work activities. (met)  5. Pt will be independent with push/pull to achieve level pelvis for increased ease with job duties. (met)  6. Pt to tolerate HEP to improve ROM and independence with ADL's (met)  7. Pt will report ability to sleep through the night without back pain. (met)  8. Pt will demonstrate understanding of proper body mechanics for lifting, pushing, and pulling for increased ease with job tasks. (met)     Long Term Goals: 8 weeks (in progress, not met)  1.Report decreased    Low back    pain  <   / =  1  /10  to  increase tolerance for ADLs (progressing)  2.Pt to increase B popliteal angle by > or = 20 degrees in order to improve flexibility and posture.  (met)  3.Increased B LE strength to 4+/5 to 5/5 in all deficient planes to increase tolerance for ADL and work activities. (met)  4. Pt will present with level pelvis and maintain it throughout session for increased ease with ADLs. (progressing)  5.Pt will lift 10-15 lb box floor to/from waist with good body mechanics without pain for increased ease with job tasks. (progressing)  6. Pt to be Independent with HEP to improve ROM and independence with ADL's (met)    Unmet goals remain appropriate within 4 weeks.      Plan     Plan of care Certification: 10/27/2020 to 11/27/2020.    Outpatient Physical Therapy 2 times weekly for 4 weeks to include the following interventions: Electrical Stimulation  , Manual Therapy, Moist Heat/ Ice, Patient Education, Self Care, Therapeutic Activites, Therapeutic Exercise and dry needling.         Jennifer Garcia, PT

## 2020-10-30 ENCOUNTER — CLINICAL SUPPORT (OUTPATIENT)
Dept: REHABILITATION | Facility: HOSPITAL | Age: 30
End: 2020-10-30
Payer: COMMERCIAL

## 2020-10-30 DIAGNOSIS — R26.89 DECREASED FUNCTIONAL MOBILITY: ICD-10-CM

## 2020-10-30 DIAGNOSIS — M62.81 MUSCLE WEAKNESS: ICD-10-CM

## 2020-10-30 PROCEDURE — 97140 MANUAL THERAPY 1/> REGIONS: CPT | Mod: PN,CQ

## 2020-10-30 PROCEDURE — 97110 THERAPEUTIC EXERCISES: CPT | Mod: PN,CQ

## 2020-10-30 NOTE — PROGRESS NOTES
Physical Therapy Treatment Note     Name: Divya Wilson  Clinic Number: [id number]    Therapy Diagnosis:   Encounter Diagnoses   Name Primary?    Muscle weakness     Decreased functional mobility      Physician: Peter Gonzalez MD    Visit Date: 10/30/2020    Physician Orders: PT Eval and Treat   Medical Diagnosis from Referral: Strain of lumbar region, initial encounter  Evaluation Date: 8/25/2020  Authorization Period Expiration: 12/31/2020  Plan of Care Expiration: 11/27/2020 (reassessed on 10/27/20)  Visit # / Visits authorized: 12 / 12 (+1/1 from eval)    Time In: 2:42  Time Out: 3:38  Total Billable Time: 45 minutes    Precautions: Standard    Subjective     Pt reports: had some spasms across the low back after her last session that took a couple days to settle down but since then has been feeling much better.   She was compliant with home exercise program.  Response to previous treatment: muscle soreness, but felt looser  Functional change: can do a little more at work  Pain: 4/10   Location: R buttock     Objective     Divya received therapeutic exercises to develop strength, ROM, flexibility and core stabilization for 30 minutes including:  Push/pull (R hip ext, L hip flex)3x3 sec  Review of seated push/pull to perform at work   Modified piriformis stretch 3x20 sec  Supine HS stretch 6x10 sec   Standing B shld ext + TA set 2x10 yellow tubing  Squat + W row 1x10  YTB  Pall of press 2x10 Yellow tubing (2 handles)  TA set + cw/ccw Yellow tubing (2 handles)   (NP)TA set + squat to  5# kettle bell from 12 in box x10  Sit to stand from 18in block with TA set and glute set x10, x5 with 5# weight  Push/pull again after exercise and after manual techniques    May add:  Progress squat lifts as tolerated, hip abd walk, sidelying shuttle    Not performed due to time:  TA sets with heel slides x10  Bridge 2 x 10, 5 sec hold  Prone glut set  Prone hip ext x 10 (followed by push/pull to ensure good  alignment)      Pt rec'd manual therapy x 10 min to include:  STM along B glut med and piriformis,  pin and stretch to R piriformis in prone (folded pillow under hips)      Pt received moist heat x 10 minutes in prone position with pillow under hips or support, push/pull post treatment prior to leaving.      Home Exercises Provided and Patient Education Provided     Education provided:   - avoid ice and NSAIDs today and tomorrow after DN  - continue to drink plenty of water  - emphasis on pain free motion with exercises  Pt gave verbal understanding to all education provided    Written Home Exercises Provided: Patient instructed to cont prior HEP.  Exercises were reviewed and Divya was able to demonstrate them prior to the end of the session.  Divya demonstrated good  understanding of the education provided.     See EMR under Patient Instructions for exercises provided 8/25/2020, 9/9/2020, 9/11/2020.    Assessment     Patient presents with very slight R anterior innominate upon arrival which corrected with seated push/pull. Patient able to manage symptoms throughout session as needed with push/pull. Core stabilization and hip strength improving well. Pain remains 4/10 post tx.  Divya is progressing well towards her goals.   Pt prognosis is Excellent.     Pt will continue to benefit from skilled outpatient physical therapy to address the deficits listed in the problem list box on initial evaluation, provide pt/family education and to maximize pt's level of independence in the home and community environment.     Pt's spiritual, cultural and educational needs considered and pt agreeable to plan of care and goals.     Anticipated barriers to physical therapy: work schedule    Goals:   Short Term Goals:  4 weeks   1.Report decreased    Low back    pain  <   / =  4  /10 at its worst  to increase tolerance for ADLs and work activities. (progressing)  2. Pt to increase B popliteal angle by > or = 5 degrees in order to  improve flexibility and posture. (progressing)  3. Increased R LE strength by 1/3 muscle grade in all deficient planes to increase tolerance for ADL and work activities. (met)  4. Increased L LE strength by 1/3 muscle grade in all deficient planes to increase tolerance for ADL and work activities. (met)  5. Pt will be independent with push/pull to achieve level pelvis for increased ease with job duties. (met)  6. Pt to tolerate HEP to improve ROM and independence with ADL's (met)  7. Pt will report ability to sleep through the night without back pain. (met)  8. Pt will demonstrate understanding of proper body mechanics for lifting, pushing, and pulling for increased ease with job tasks. (met)     Long Term Goals: 8 weeks (in progress, not met)  1.Report decreased    Low back    pain  <   / =  1  /10  to increase tolerance for ADLs  2.Pt to increase B popliteal angle by > or = 20 degrees in order to improve flexibility and posture.   3.Increased B LE strength to 4+/5 to 5/5 in all deficient planes to increase tolerance for ADL and work activities.  4. Pt will present with level pelvis and maintain it throughout session for increased ease with ADLs.  5.Pt will lift 10-15 box floor to/from waist with good body mechanics without pain for increased ease with job tasks.  6. Pt to be Independent with HEP to improve ROM and independence with ADL's      Plan     Continue PT towards established goals.  May add: TA set + cw/ccw blue tubing (single handle)     Lisa Estes PTA

## 2020-11-03 ENCOUNTER — CLINICAL SUPPORT (OUTPATIENT)
Dept: REHABILITATION | Facility: HOSPITAL | Age: 30
End: 2020-11-03
Payer: COMMERCIAL

## 2020-11-03 DIAGNOSIS — R26.89 DECREASED FUNCTIONAL MOBILITY: ICD-10-CM

## 2020-11-03 DIAGNOSIS — M62.81 MUSCLE WEAKNESS: ICD-10-CM

## 2020-11-03 PROCEDURE — 97140 MANUAL THERAPY 1/> REGIONS: CPT | Mod: PN,CQ

## 2020-11-03 PROCEDURE — 97110 THERAPEUTIC EXERCISES: CPT | Mod: PN,CQ

## 2020-11-03 NOTE — PROGRESS NOTES
"    Physical Therapy Treatment Note     Name: Divya Wilson  Clinic Number: [id number]    Therapy Diagnosis:   Encounter Diagnoses   Name Primary?    Muscle weakness     Decreased functional mobility      Physician: Peter Gonzalez MD    Visit Date: 11/3/2020    Physician Orders: PT Eval and Treat   Medical Diagnosis from Referral: Strain of lumbar region, initial encounter  Evaluation Date: 8/25/2020  Authorization Period Expiration: 12/31/2020  Plan of Care Expiration: 11/27/2020 (reassessed on 10/27/20)  Visit # / Visits authorized: 1 / 20 (+13 prior)    Time In: 8:33  Time Out: 9:33  Total Billable Time: 50 minutes    Precautions: Standard    Subjective     Pt reports: overall feeling a little less tight across the low back and into the buttocks. Feels the seated push/pull has been helpful in reducing low back discomfort toward the end of her work days. Still using a cold pack to and from work, but using the heating pad at home before bed.  She was compliant with home exercise program.  Response to previous treatment: muscle soreness, but felt looser  Functional change: can do a little more at work  Pain: 4/10, just less tight  Location: R buttock     Objective     Divya received therapeutic exercises to develop strength, ROM, flexibility and core stabilization for 30 minutes including:  Push/pull (R hip ext, L hip flex)3x3 sec  Review of seated push/pull to perform at work   Modified piriformis stretch 3x20 sec  Supine HS stretch 6x10 sec   Standing B shld ext + TA set 2x10 yellow tubing  Squat + W row 1x15  YTB  Pall pot-stirrers + TA set cw/ccw Yellow tubing (2 handles) x15 each   TA set + squat to  5# kettle bell (KB) from 12 in box x5, 10# KB x5  Sit to stand from 18in block with TA set and glute set 5# KB x 10  Prone quad stretch with strap 3x20"  Push/pull again after exercise and after manual techniques    May add:  Progress squat lifts as tolerated, hip abd walk, sidelying shuttle    Not " performed due to time:  TA sets with heel slides x10  Bridge 2 x 10, 5 sec hold  Prone glut set  Prone hip ext x 10 (followed by push/pull to ensure good alignment)      Pt rec'd manual therapy x 10 min to include:  STM along B glut med and piriformis,  pin and stretch to R piriformis in prone (folded pillow under hips)    Pt received moist heat x 10 minutes in prone position with pillow under hips or support, push/pull post treatment prior to leaving.    Home Exercises Provided and Patient Education Provided     Education provided:   - avoid ice and NSAIDs today and tomorrow after DN  - continue to drink plenty of water  - emphasis on pain free motion with exercises  Pt gave verbal understanding to all education provided    Written Home Exercises Provided: Patient instructed to cont prior HEP.  Exercises were reviewed and Divya was able to demonstrate them prior to the end of the session.  Divya demonstrated good  understanding of the education provided.     See EMR under Patient Instructions for exercises provided 8/25/2020, 9/9/2020, 9/11/2020.    Assessment     Patient presents with very slight R anterior innominate upon arrival which corrected with seated push/pull. Improving technique and tolerance with progressing functional exercises without onset of pain or dysfunction. Decreased tightness overall but tenderness continues throughout bilateral glut med, piriformis, and sacral borders. Decreased lumbar discomfort with squats noted this date, with improved core activation. Pain remains 4/10 post tx.  Divya is progressing well towards her goals.   Pt prognosis is Excellent.     Pt will continue to benefit from skilled outpatient physical therapy to address the deficits listed in the problem list box on initial evaluation, provide pt/family education and to maximize pt's level of independence in the home and community environment.     Pt's spiritual, cultural and educational needs considered and pt agreeable  to plan of care and goals.     Anticipated barriers to physical therapy: work schedule    Goals:   Short Term Goals:  4 weeks   1.Report decreased    Low back    pain  <   / =  4  /10 at its worst  to increase tolerance for ADLs and work activities. (progressing)  2. Pt to increase B popliteal angle by > or = 5 degrees in order to improve flexibility and posture. (progressing)  3. Increased R LE strength by 1/3 muscle grade in all deficient planes to increase tolerance for ADL and work activities. (met)  4. Increased L LE strength by 1/3 muscle grade in all deficient planes to increase tolerance for ADL and work activities. (met)  5. Pt will be independent with push/pull to achieve level pelvis for increased ease with job duties. (met)  6. Pt to tolerate HEP to improve ROM and independence with ADL's (met)  7. Pt will report ability to sleep through the night without back pain. (met)  8. Pt will demonstrate understanding of proper body mechanics for lifting, pushing, and pulling for increased ease with job tasks. (met)     Long Term Goals: 8 weeks (in progress, not met)  1.Report decreased    Low back    pain  <   / =  1  /10  to increase tolerance for ADLs  2.Pt to increase B popliteal angle by > or = 20 degrees in order to improve flexibility and posture.   3.Increased B LE strength to 4+/5 to 5/5 in all deficient planes to increase tolerance for ADL and work activities.  4. Pt will present with level pelvis and maintain it throughout session for increased ease with ADLs.  5.Pt will lift 10-15 box floor to/from waist with good body mechanics without pain for increased ease with job tasks.  6. Pt to be Independent with HEP to improve ROM and independence with ADL's      Plan     Continue PT towards established goals.  May add: TA set + cw/ccw blue tubing (single handle)     Lisa Estes, JOSE

## 2020-11-09 ENCOUNTER — CLINICAL SUPPORT (OUTPATIENT)
Dept: REHABILITATION | Facility: HOSPITAL | Age: 30
End: 2020-11-09
Payer: COMMERCIAL

## 2020-11-09 DIAGNOSIS — R26.89 DECREASED FUNCTIONAL MOBILITY: ICD-10-CM

## 2020-11-09 DIAGNOSIS — M62.81 MUSCLE WEAKNESS: ICD-10-CM

## 2020-11-09 PROCEDURE — 97110 THERAPEUTIC EXERCISES: CPT | Mod: PN,CQ

## 2020-11-09 PROCEDURE — 97140 MANUAL THERAPY 1/> REGIONS: CPT | Mod: PN,CQ

## 2020-11-09 NOTE — PROGRESS NOTES
"    Physical Therapy Treatment Note     Name: Divya iWlson  Clinic Number: [id number]    Therapy Diagnosis:   Encounter Diagnoses   Name Primary?    Muscle weakness     Decreased functional mobility      Physician: Peter Gonzalez MD    Visit Date: 11/9/2020    Physician Orders: PT Eval and Treat   Medical Diagnosis from Referral: Strain of lumbar region, initial encounter  Evaluation Date: 8/25/2020  Authorization Period Expiration: 12/31/2020  Plan of Care Expiration: 11/27/2020 (reassessed on 10/27/20)  Visit # / Visits authorized: 2 / 20 (+13 prior)    Time In: 5:32  Time Out: 6:32  Total Billable Time: 50 minutes    Precautions: Standard    Subjective     Pt reports: feeling okay right now, was able to complete 11 days in a row at work with mostly just tightness and less specific painoverall feeling a little less tight across the low back and into the buttocks. Feels the seated push/pull has been helpful in reducing low back discomfort toward the end of her work days. Still using a cold pack to and from work, but using the heating pad at home before bed.  She was compliant with home exercise program.  Response to previous treatment: muscle soreness, but felt looser  Functional change: can do a little more at work  Pain: 4/10, tightness more than pain  Location: R buttock     Objective     Divya received therapeutic exercises to develop strength, ROM, flexibility and core stabilization for 30 minutes including:  Push/pull (R hip ext, L hip flex)3x3 sec  Review of seated push/pull to perform at work   Modified piriformis stretch 3x20 sec  Supine HS stretch 6x10 sec   Standing B shld ext + TA set 2x15 yellow tubing  Squat + W row 1x15  BTB  Hip abd walk in walkway x 1 lap (knees straight)  Pall pot-stirrers + TA set cw/ccw Yellow tubing (2 handles) x15 each   TA set + squat to  10# kettle bell (KB) from 12 in box x5, 10# KB from 8" box x5  Sit to stand from 18in block with TA set and glute set 5# KB " "x 5, 10# KB x5  Prone quad stretch with strap 3x20"  Push/pull again after exercise and after manual techniques    May add:  Progress squat lifts as tolerated, hip abd walk, sidelying shuttle    Not performed due to time:  TA sets with heel slides x10  Bridge 2 x 10, 5 sec hold  Prone glut set  Prone hip ext x 10 (followed by push/pull to ensure good alignment)      Pt rec'd manual therapy x 10 min to include:  STM along B glut med and piriformis,  pin and stretch to R piriformis in prone (folded pillow under hips)    Pt received moist heat x 10 minutes in prone position with pillow under hips or support, push/pull post treatment prior to leaving.    Home Exercises Provided and Patient Education Provided     Education provided:   - avoid ice and NSAIDs today and tomorrow after DN  - continue to drink plenty of water  - emphasis on pain free motion with exercises  Pt gave verbal understanding to all education provided    Written Home Exercises Provided: Patient instructed to cont prior HEP.  Exercises were reviewed and Divya was able to demonstrate them prior to the end of the session.  Divya demonstrated good  understanding of the education provided.     See EMR under Patient Instructions for exercises provided 8/25/2020, 9/9/2020, 9/11/2020.    Assessment     Patient continues to progress with core and LE strength and endurance. Patient able to complete exercise progressions and additional hip abd walk with noted muscle fatigue but no onset of pain to the low back. Continued but overall decreased tightness and tenderness along glut med and piriformis, L > R. Pain remains 3.5/10 post tx.   Divya is progressing well towards her goals.   Pt prognosis is Excellent.     Pt will continue to benefit from skilled outpatient physical therapy to address the deficits listed in the problem list box on initial evaluation, provide pt/family education and to maximize pt's level of independence in the home and community " environment.     Pt's spiritual, cultural and educational needs considered and pt agreeable to plan of care and goals.     Anticipated barriers to physical therapy: work schedule    Goals:   Short Term Goals:  4 weeks   1.Report decreased    Low back    pain  <   / =  4  /10 at its worst  to increase tolerance for ADLs and work activities. (progressing)  2. Pt to increase B popliteal angle by > or = 5 degrees in order to improve flexibility and posture. (progressing)  3. Increased R LE strength by 1/3 muscle grade in all deficient planes to increase tolerance for ADL and work activities. (met)  4. Increased L LE strength by 1/3 muscle grade in all deficient planes to increase tolerance for ADL and work activities. (met)  5. Pt will be independent with push/pull to achieve level pelvis for increased ease with job duties. (met)  6. Pt to tolerate HEP to improve ROM and independence with ADL's (met)  7. Pt will report ability to sleep through the night without back pain. (met)  8. Pt will demonstrate understanding of proper body mechanics for lifting, pushing, and pulling for increased ease with job tasks. (met)     Long Term Goals: 8 weeks (in progress, not met)  1.Report decreased    Low back    pain  <   / =  1  /10  to increase tolerance for ADLs  2.Pt to increase B popliteal angle by > or = 20 degrees in order to improve flexibility and posture.   3.Increased B LE strength to 4+/5 to 5/5 in all deficient planes to increase tolerance for ADL and work activities.  4. Pt will present with level pelvis and maintain it throughout session for increased ease with ADLs.  5.Pt will lift 10-15 box floor to/from waist with good body mechanics without pain for increased ease with job tasks.  6. Pt to be Independent with HEP to improve ROM and independence with ADL's      Plan     Continue PT towards established goals.  May add: TA set + cw/ccw blue tubing (single handle)     Lisa Estes, JOSE

## 2020-11-13 ENCOUNTER — CLINICAL SUPPORT (OUTPATIENT)
Dept: REHABILITATION | Facility: HOSPITAL | Age: 30
End: 2020-11-13
Payer: COMMERCIAL

## 2020-11-13 DIAGNOSIS — R26.89 DECREASED FUNCTIONAL MOBILITY: ICD-10-CM

## 2020-11-13 DIAGNOSIS — M62.81 MUSCLE WEAKNESS: ICD-10-CM

## 2020-11-13 PROCEDURE — 97140 MANUAL THERAPY 1/> REGIONS: CPT | Mod: PN,CQ

## 2020-11-13 PROCEDURE — 97110 THERAPEUTIC EXERCISES: CPT | Mod: PN,CQ

## 2020-11-13 NOTE — PROGRESS NOTES
"    Physical Therapy Treatment Note     Name: Divya Wilson  Clinic Number: [id number]    Therapy Diagnosis:   No diagnosis found.  Physician: Peter Gonzalez MD    Visit Date: 11/13/2020    Physician Orders: PT Eval and Treat   Medical Diagnosis from Referral: Strain of lumbar region, initial encounter  Evaluation Date: 8/25/2020  Authorization Period Expiration: 12/31/2020  Plan of Care Expiration: 11/27/2020 (reassessed on 10/27/20)  Visit # / Visits authorized: 3 / 20 (+13 prior)    Time In: 2:36  Time Out: 3:26  Total Billable Time: 40 minutes    Precautions: Standard    Subjective     Pt reports: continued tightness central in the pelvis near the sacrum but overall pain is improving. Slight pinch to R buttock at one point but alleviated with rest and seated push/pull at work. Able to lift and move things at work without onset of pain and having less discomfort when getting home from work.  She was compliant with home exercise program.  Response to previous treatment: muscle soreness, but felt looser  Functional change: can do a little more at work with more confidence    Pain: 4/10, tightness more than pain  Location: R buttock     Objective     Divya received therapeutic exercises to develop strength, ROM, flexibility and core stabilization for 30 minutes including:  (NP to start due to pelvis level upon arrival) Push/pull (R hip ext, L hip flex)3x3 sec  Review of seated push/pull to perform at work   Modified piriformis stretch 3x20 sec  Supine HS stretch 6x10 sec   Standing B shld ext + TA set 2x10 Blue tubing  Squat + W row 2x10  Blue tubing  Pall pot-stirrers + TA set cw/ccw Yellow tubing (2 handles) x15 each   Hip abd walk in walkway x 1 lap (knees straight)  TA set + squat to  10# kettle bell (KB) from 12 in box x10, 10# KB from 8" box x5  Sit to stand from 18in block with TA set and glute set 10# KB x 10  Prone quad stretch with strap 3x20"  Push/pull again after exercise and after manual " techniques    May add:  Progress squat lifts as tolerated,  sidelying shuttle    Not performed due to time:  TA sets with heel slides x10  Bridge 2 x 10, 5 sec hold  Prone glut set  Prone hip ext x 10 (followed by push/pull to ensure good alignment)      Pt rec'd manual therapy x 10 min to include:  STM along B glut med and piriformis,  pin and stretch to R piriformis in prone (folded pillow under hips)    Pt received moist heat x 10 minutes in prone position with pillow under hips or support, push/pull post treatment prior to leaving.    Home Exercises Provided and Patient Education Provided     Education provided:   - avoid ice and NSAIDs today and tomorrow after DN  - continue to drink plenty of water  - emphasis on pain free motion with exercises  Pt gave verbal understanding to all education provided    Written Home Exercises Provided: Patient instructed to cont prior HEP.  Exercises were reviewed and Divya was able to demonstrate them prior to the end of the session.  Divya demonstrated good  understanding of the education provided.     See EMR under Patient Instructions for exercises provided 8/25/2020, 9/9/2020, 9/11/2020.    Assessment     Patient presents with continued improvement of symptoms with overall decreased tightness along the sacral border. Some tightness continues into the R piriformis as well as tenderness to B SI joints and L piriformis. Core, glute, and LE strength is improving as well, as patient is able to further progress squats and box to waist lifts. Pt will benefit from continued progression of strength and endurance as able to improve tolerance to functional and work activities.  Divya is progressing well towards her goals.   Pt prognosis is Excellent.     Pt will continue to benefit from skilled outpatient physical therapy to address the deficits listed in the problem list box on initial evaluation, provide pt/family education and to maximize pt's level of independence in the home  and community environment.     Pt's spiritual, cultural and educational needs considered and pt agreeable to plan of care and goals.     Anticipated barriers to physical therapy: work schedule    Goals:   Short Term Goals:  4 weeks   1.Report decreased    Low back    pain  <   / =  4  /10 at its worst  to increase tolerance for ADLs and work activities. (progressing)  2. Pt to increase B popliteal angle by > or = 5 degrees in order to improve flexibility and posture. (progressing)  3. Increased R LE strength by 1/3 muscle grade in all deficient planes to increase tolerance for ADL and work activities. (met)  4. Increased L LE strength by 1/3 muscle grade in all deficient planes to increase tolerance for ADL and work activities. (met)  5. Pt will be independent with push/pull to achieve level pelvis for increased ease with job duties. (met)  6. Pt to tolerate HEP to improve ROM and independence with ADL's (met)  7. Pt will report ability to sleep through the night without back pain. (met)  8. Pt will demonstrate understanding of proper body mechanics for lifting, pushing, and pulling for increased ease with job tasks. (met)     Long Term Goals: 8 weeks (in progress, not met)  1.Report decreased    Low back    pain  <   / =  1  /10  to increase tolerance for ADLs  2.Pt to increase B popliteal angle by > or = 20 degrees in order to improve flexibility and posture.   3.Increased B LE strength to 4+/5 to 5/5 in all deficient planes to increase tolerance for ADL and work activities.  4. Pt will present with level pelvis and maintain it throughout session for increased ease with ADLs.  5.Pt will lift 10-15 box floor to/from waist with good body mechanics without pain for increased ease with job tasks.  6. Pt to be Independent with HEP to improve ROM and independence with ADL's      Plan     Continue PT towards established goals.    Lisa Estes, PTA

## 2020-11-17 ENCOUNTER — CLINICAL SUPPORT (OUTPATIENT)
Dept: REHABILITATION | Facility: HOSPITAL | Age: 30
End: 2020-11-17
Payer: COMMERCIAL

## 2020-11-17 DIAGNOSIS — M62.81 MUSCLE WEAKNESS: ICD-10-CM

## 2020-11-17 DIAGNOSIS — R26.89 DECREASED FUNCTIONAL MOBILITY: ICD-10-CM

## 2020-11-17 PROCEDURE — 97110 THERAPEUTIC EXERCISES: CPT | Mod: PN

## 2020-11-17 PROCEDURE — 97140 MANUAL THERAPY 1/> REGIONS: CPT | Mod: PN

## 2020-11-17 NOTE — PROGRESS NOTES
"    Physical Therapy Treatment Note     Name: Divya Wilson  Clinic Number: [id number]    Therapy Diagnosis:   Encounter Diagnoses   Name Primary?    Muscle weakness     Decreased functional mobility      Physician: Peter Gonzalez MD    Visit Date: 11/17/2020    Physician Orders: PT Eval and Treat   Medical Diagnosis from Referral: Strain of lumbar region, initial encounter  Evaluation Date: 8/25/2020  Authorization Period Expiration: 12/31/2020  Plan of Care Expiration: 11/27/2020 (reassessed on 10/27/20)  Visit # / Visits authorized: 3 / 20 (+13 prior)    Time In: 8:35  Time Out: 9:15  Total Billable Time: 40 minutes    Precautions: Standard    Subjective     Pt reports: her pain is less overall.  She states the pain is more centralized to center of low back.  She states she has not had much pain to buttocks over last week.  She was compliant with home exercise program.  Response to previous treatment: muscle soreness, but felt looser  Functional change:     Pain: 3.5/10, tightness more than pain  Location: R buttock     Objective     Divya received therapeutic exercises to develop strength, ROM, flexibility and core stabilization for 30 minutes including:  (NP to start due to pelvis level upon arrival) Push/pull (R hip ext, L hip flex)3x3 sec  Review of seated push/pull to perform at work   Modified piriformis stretch 3x20 sec  Supine HS stretch 6x10 sec   Standing B shld ext + TA set 3x10 Blue tubing  Squat + W row 2x10  Blue tubing  Pall pot-stirrers + TA set cw/ccw Yellow tubing (2 handles) x15 each   Sidelying shuttle 2x10 ea  NP Hip abd walk in walkway x 1 lap (knees straight)  TA set + squat to  10# kettle bell (KB) from 8 in box x10  Goblet squat with 10# DB-- 18in block for manual cue x10 (may add foam next visit for cue)  NP Prone quad stretch with strap 3x20"  Push/pull again after exercise and after manual techniques    May add:  progress squatting as tolerated, progress to lifting 10-15 " lb box floor to/from waist for work goal    Pt rec'd manual therapy x 10 min to include:  STM along B glut med,  pin and stretch to L piriformis in prone (folded pillow under hips)    Pt received moist heat x 10 minutes in prone position with pillow under hips or support, push/pull post treatment prior to leaving.    Home Exercises Provided and Patient Education Provided     Education provided:   -proper pelvis position  - emphasis on pain free motion with exercises  Pt gave verbal understanding to all education provided    Written Home Exercises Provided: Patient instructed to cont prior HEP.  Exercises were reviewed and Divya was able to demonstrate them prior to the end of the session.  Divya demonstrated good  understanding of the education provided.     See EMR under Patient Instructions for exercises provided 8/25/2020, 9/9/2020, 9/11/2020.    Assessment     Patient with continued centralization of symptoms.  She had some tightness to B glute medius and L piriformis, but much less than previously.  Muscles loosened with manual therapy.  Goblet squat was challenging and required push/pull after for level pelvis.  She will continue to benefit from PT for progressive core/glute/LE strengthening for improved functional mobility for job tasks.  Divya is progressing well towards her goals.   Pt prognosis is Excellent.     Pt will continue to benefit from skilled outpatient physical therapy to address the deficits listed in the problem list box on initial evaluation, provide pt/family education and to maximize pt's level of independence in the home and community environment.     Pt's spiritual, cultural and educational needs considered and pt agreeable to plan of care and goals.     Anticipated barriers to physical therapy: work schedule    Goals:   Short Term Goals:  4 weeks   1.Report decreased    Low back    pain  <   / =  4  /10 at its worst  to increase tolerance for ADLs and work activities.  (progressing)  2. Pt to increase B popliteal angle by > or = 5 degrees in order to improve flexibility and posture. (progressing)  3. Increased R LE strength by 1/3 muscle grade in all deficient planes to increase tolerance for ADL and work activities. (met)  4. Increased L LE strength by 1/3 muscle grade in all deficient planes to increase tolerance for ADL and work activities. (met)  5. Pt will be independent with push/pull to achieve level pelvis for increased ease with job duties. (met)  6. Pt to tolerate HEP to improve ROM and independence with ADL's (met)  7. Pt will report ability to sleep through the night without back pain. (met)  8. Pt will demonstrate understanding of proper body mechanics for lifting, pushing, and pulling for increased ease with job tasks. (met)     Long Term Goals: 8 weeks (in progress, not met)  1.Report decreased    Low back    pain  <   / =  1  /10  to increase tolerance for ADLs  2.Pt to increase B popliteal angle by > or = 20 degrees in order to improve flexibility and posture.   3.Increased B LE strength to 4+/5 to 5/5 in all deficient planes to increase tolerance for ADL and work activities.  4. Pt will present with level pelvis and maintain it throughout session for increased ease with ADLs.  5.Pt will lift 10-15 box floor to/from waist with good body mechanics without pain for increased ease with job tasks.  6. Pt to be Independent with HEP to improve ROM and independence with ADL's      Plan     Continue PT towards established goals.    Jennifer Garcia, PT

## 2020-11-20 ENCOUNTER — CLINICAL SUPPORT (OUTPATIENT)
Dept: REHABILITATION | Facility: HOSPITAL | Age: 30
End: 2020-11-20
Payer: COMMERCIAL

## 2020-11-20 DIAGNOSIS — M62.81 MUSCLE WEAKNESS: ICD-10-CM

## 2020-11-20 DIAGNOSIS — R26.89 DECREASED FUNCTIONAL MOBILITY: ICD-10-CM

## 2020-11-20 PROCEDURE — 97140 MANUAL THERAPY 1/> REGIONS: CPT | Mod: PN,CQ

## 2020-11-20 PROCEDURE — 97110 THERAPEUTIC EXERCISES: CPT | Mod: PN,CQ

## 2020-11-20 NOTE — PROGRESS NOTES
"      Physical Therapy Treatment Note     Name: Divya Wilson  Clinic Number: [id number]    Therapy Diagnosis:   No diagnosis found.  Physician: Peter Gonzalez MD    Visit Date: 11/20/2020    Physician Orders: PT Eval and Treat   Medical Diagnosis from Referral: Strain of lumbar region, initial encounter  Evaluation Date: 8/25/2020  Authorization Period Expiration: 12/31/2020  Plan of Care Expiration: 11/27/2020 (reassessed on 10/27/20)  Visit # / Visits authorized: 4 / 20 (+13 prior)    Time In: 2:35  Time Out: 3:30  Total Billable Time: 45 minutes    Precautions: Standard    Subjective     Pt reports: mild increase of pinching into the R sided buttock. Noticing she is able to do more at work which is also likely why she is hurting worse today. Not sitting on ice as much at work now.  She was compliant with home exercise program.  Response to previous treatment: muscle soreness, but felt looser  Functional change: using ice less at work, can do more at work    Pain: 5.5/10, tightness more than pain  Location: R buttock     Objective     Divya received therapeutic exercises to develop strength, ROM, flexibility and core stabilization for 35 minutes including:  (NP to start due to pelvis level upon arrival) Push/pull (R hip ext, L hip flex)3x3 sec  Review of seated push/pull to perform at work   Modified piriformis stretch 3x20 sec  Supine HS stretch 6x10 sec   Standing B shld ext + TA set 3x10 Blue tubing  Squat + W row 2x10  Blue tubing  Pall pot-stirrers + TA set cw/ccw Yellow tubing (2 handles) x15 each   Sidelying shuttle 1 red band 2x10 ea  NP Hip abd walk in walkway x 1 lap (knees straight)  TA set + squat to  10# kettle bell (KB) from floor 2 x 5  10# box (6" step +7# weight =10#) lift from floor to waist x 5  Goblet squat with 10# DB-- 18in block for manual cue x10 (may add foam next visit for cue)  NP Prone quad stretch with strap 3x20"  Push/pull again after exercise and after manual " techniques    May add:  progress squatting as tolerated, progress to lifting 10 lb box to/from waist to chest height for work goal    Pt rec'd manual therapy x 10 min to include:  STM along B glut med,  pin and stretch to R piriformis in prone (folded pillow under hips)    Pt received cold pack for 8 minutes in prone position with pillow under hips or support, push/pull post treatment prior to leaving.    Home Exercises Provided and Patient Education Provided     Education provided:   -proper pelvis position  - emphasis on pain free motion with exercises  Pt gave verbal understanding to all education provided    Written Home Exercises Provided: Patient instructed to cont prior HEP.  Exercises were reviewed and Divya was able to demonstrate them prior to the end of the session.  Divya demonstrated good  understanding of the education provided.     See EMR under Patient Instructions for exercises provided 8/25/2020, 9/9/2020, 9/11/2020.    Assessment     Patient presents with overall good tolerance to progression of treatment this date. She was able to complete 12inches off floor to waist box lifts as well as floor to waist box lifts with no increase of pain, only slight discomfort to R SI joint. Very mild tightness along L lumbar paraspinals and R piriformis which both improved with manual techniques. Goblet squats and box lifts remain most challenging due to core, glut and LE weakness. She will benefit from continued progression of functional strengthening as able for improved functional mobility for job tasks.  Divya is progressing well towards her goals.   Pt prognosis is Excellent.     Pt will continue to benefit from skilled outpatient physical therapy to address the deficits listed in the problem list box on initial evaluation, provide pt/family education and to maximize pt's level of independence in the home and community environment.     Pt's spiritual, cultural and educational needs considered and pt  agreeable to plan of care and goals.     Anticipated barriers to physical therapy: work schedule    Goals:   Short Term Goals:  4 weeks   1.Report decreased    Low back    pain  <   / =  4  /10 at its worst  to increase tolerance for ADLs and work activities. (progressing)  2. Pt to increase B popliteal angle by > or = 5 degrees in order to improve flexibility and posture. (progressing)  3. Increased R LE strength by 1/3 muscle grade in all deficient planes to increase tolerance for ADL and work activities. (met)  4. Increased L LE strength by 1/3 muscle grade in all deficient planes to increase tolerance for ADL and work activities. (met)  5. Pt will be independent with push/pull to achieve level pelvis for increased ease with job duties. (met)  6. Pt to tolerate HEP to improve ROM and independence with ADL's (met)  7. Pt will report ability to sleep through the night without back pain. (met)  8. Pt will demonstrate understanding of proper body mechanics for lifting, pushing, and pulling for increased ease with job tasks. (met)     Long Term Goals: 8 weeks (in progress, not met)  1.Report decreased    Low back    pain  <   / =  1  /10  to increase tolerance for ADLs  2.Pt to increase B popliteal angle by > or = 20 degrees in order to improve flexibility and posture.   3.Increased B LE strength to 4+/5 to 5/5 in all deficient planes to increase tolerance for ADL and work activities.  4. Pt will present with level pelvis and maintain it throughout session for increased ease with ADLs.  5.Pt will lift 10-15 box floor to/from waist with good body mechanics without pain for increased ease with job tasks.  6. Pt to be Independent with HEP to improve ROM and independence with ADL's      Plan     Continue PT towards established goals.    Lisa Estes, PTA

## 2020-11-23 ENCOUNTER — CLINICAL SUPPORT (OUTPATIENT)
Dept: REHABILITATION | Facility: HOSPITAL | Age: 30
End: 2020-11-23
Payer: COMMERCIAL

## 2020-11-23 DIAGNOSIS — M62.81 MUSCLE WEAKNESS: ICD-10-CM

## 2020-11-23 DIAGNOSIS — R26.89 DECREASED FUNCTIONAL MOBILITY: ICD-10-CM

## 2020-11-23 PROCEDURE — 97035 APP MDLTY 1+ULTRASOUND EA 15: CPT | Mod: PN

## 2020-11-23 PROCEDURE — 97014 ELECTRIC STIMULATION THERAPY: CPT | Mod: PN,GY

## 2020-11-23 PROCEDURE — 97110 THERAPEUTIC EXERCISES: CPT | Mod: PN

## 2020-11-23 NOTE — PROGRESS NOTES
Updated Plan of Care and Physical Therapy Treatment Note     Name: Divya Wilson  Clinic Number: [id number]    Therapy Diagnosis:   No diagnosis found.  Physician: Peter Gonzalez MD    Visit Date: 11/23/2020    Physician Orders: PT Eval and Treat   Medical Diagnosis from Referral: Strain of lumbar region, initial encounter  Evaluation Date: 8/25/2020  Authorization Period Expiration: 12/31/2020  Plan of Care Expiration: 12/25/2020 (reassessed on 11/23/20)  Visit # / Visits authorized: 5 / 20 (+13 prior)    Time In: 1:45  Time Out: 2:35  Total Billable Time: 40 minutes    Precautions: Standard    Subjective     Pt reports: she was a little sore after last visit on Friday and then she woke up Saturday morning with muscle spasms and pain to low back.  She states she went to [facility] on Saturday, but had to sit most of the time due to pain.  She took muscle relaxors on Sunday and sat with the heat.  She also performed push/pull periodically to decrease pain.  She was compliant with home exercise program.  Response to previous treatment: muscle soreness, but felt looser  Functional change: was doing well until Saturday morning with muscle spasms    Pain: 8/10   Location: central low back pain and B buttock     Objective     Will reassess next visit    Pt presented to clinic with R anterior innominate  Divya received therapeutic exercises to develop strength, ROM, flexibility and core stabilization for 17 minutes including:  Push/pull (R hip ext, L hip flex)3x3 sec  Review of seated push/pull to perform at work   Modified piriformis stretch 3x20 sec  Supine HS stretch 6x10 sec   glute sets in supine 20x5 sec hold    Pt rec'd ultrasound (1.0MHz, continuous, 1.1W/cm2) to L lumbar paraspinal x8 min for decreased pain and increased tissue extensibility.  Prone with folded pillow under hips    Pt rec'd manual therapy x5 min to include: STM to L lumbar paraspinals, L glute medius  Pin and stretch to L  "piriformis     Pt rec'd IFC electrical stimulation (40%scan) to low back x10 min in conjuction with moist heat for decreased pain and muscle relaxation.  Prone with folded pillow under hips    Not performed due to recent flare up and increased low back pain:  Standing B shld ext + TA set 3x10 Blue tubing  Squat + W row 2x10  Blue tubing  Pall pot-stirrers + TA set cw/ccw Yellow tubing (2 handles) x15 each   Sidelying shuttle 1 red band 2x10 ea  NP Hip abd walk in walkway x 1 lap (knees straight)  TA set + squat to  10# kettle bell (KB) from floor 2 x 5  10# box (6" step +7# weight =10#) lift from floor to waist x 5  Goblet squat with 10# DB-- 18in block for manual cue x10 (may add foam next visit for cue)  NP Prone quad stretch with strap 3x20"  Push/pull again after exercise and after manual techniques    May add:  progress squatting as tolerated, progress to lifting 10 lb box to/from waist to chest height for work goal    Pt received cold pack for 8 minutes in prone position with pillow under hips or support, push/pull post treatment prior to leaving.    Home Exercises Provided and Patient Education Provided     Education provided:   -proper pelvis position  - emphasis on pain free motion with exercises  Pt gave verbal understanding to all education provided    Written Home Exercises Provided: Patient instructed to cont prior HEP.  Exercises were reviewed and Divya was able to demonstrate them prior to the end of the session.  Divya demonstrated good  understanding of the education provided.     See EMR under Patient Instructions for exercises provided 8/25/2020, 9/9/2020, 9/11/2020.    Assessment     Patient presents with flare up of pain to low back today. She presented with increased tightness to L lumbar paraspinals, L glute medius, and L piriformis, which all loosened with ultrasound and manual therapy.  She reported decreased pain to 6/10 to low back post treatment and was able to walk and perform " bed mobility with increased ease per pt report by end of session.  She will benefit from continued progression of functional strengthening as able for improved functional mobility for job tasks.  Divya is progressing well towards her goals.   Pt prognosis is Excellent.     Pt will continue to benefit from skilled outpatient physical therapy to address the deficits listed in the problem list box on initial evaluation, provide pt/family education and to maximize pt's level of independence in the home and community environment.     Pt's spiritual, cultural and educational needs considered and pt agreeable to plan of care and goals.     Anticipated barriers to physical therapy: work schedule    Goals:   Short Term Goals:  4 weeks   1.Report decreased    Low back    pain  <   / =  4  /10 at its worst  to increase tolerance for ADLs and work activities. (progressing)  2. Pt to increase B popliteal angle by > or = 5 degrees in order to improve flexibility and posture. (progressing)  3. Increased R LE strength by 1/3 muscle grade in all deficient planes to increase tolerance for ADL and work activities. (met)  4. Increased L LE strength by 1/3 muscle grade in all deficient planes to increase tolerance for ADL and work activities. (met)  5. Pt will be independent with push/pull to achieve level pelvis for increased ease with job duties. (met)  6. Pt to tolerate HEP to improve ROM and independence with ADL's (met)  7. Pt will report ability to sleep through the night without back pain. (met)  8. Pt will demonstrate understanding of proper body mechanics for lifting, pushing, and pulling for increased ease with job tasks. (met)     Long Term Goals: 8 weeks (in progress, not met)  1.Report decreased    Low back    pain  <   / =  1  /10  to increase tolerance for ADLs  2.Pt to increase B popliteal angle by > or = 20 degrees in order to improve flexibility and posture.   3.Increased B LE strength to 4+/5 to 5/5 in all deficient  planes to increase tolerance for ADL and work activities.  4. Pt will present with level pelvis and maintain it throughout session for increased ease with ADLs.  5.Pt will lift 10-15 box floor to/from waist with good body mechanics without pain for increased ease with job tasks.  6. Pt to be Independent with HEP to improve ROM and independence with ADL's      Plan     Reassess next visit.    Added ultrasound today due to recent flare up.        Jennifer Garcia, PT

## 2020-11-30 ENCOUNTER — DOCUMENTATION ONLY (OUTPATIENT)
Dept: REHABILITATION | Facility: HOSPITAL | Age: 30
End: 2020-11-30

## 2020-11-30 ENCOUNTER — CLINICAL SUPPORT (OUTPATIENT)
Dept: REHABILITATION | Facility: HOSPITAL | Age: 30
End: 2020-11-30
Payer: COMMERCIAL

## 2020-11-30 DIAGNOSIS — R26.89 DECREASED FUNCTIONAL MOBILITY: ICD-10-CM

## 2020-11-30 DIAGNOSIS — M62.81 MUSCLE WEAKNESS: ICD-10-CM

## 2020-11-30 PROCEDURE — 97110 THERAPEUTIC EXERCISES: CPT | Mod: PN

## 2020-11-30 PROCEDURE — 97140 MANUAL THERAPY 1/> REGIONS: CPT | Mod: PN

## 2020-11-30 NOTE — PROGRESS NOTES
PT/PTA met face to face to discuss pt's treatment plan and progress towards established goals. Pt will be seen by a physical therapist minimally every 6th visit or every 30 days.    Please see Updated Plan of Care dated 11/30/2020 for changes and updated goals.    Jennifer Garcia, PT

## 2020-11-30 NOTE — PLAN OF CARE
Updated Plan of Care and Physical Therapy Treatment Note     Name: Divya Wilson  Clinic Number: [id number]    Therapy Diagnosis:   Encounter Diagnoses   Name Primary?    Muscle weakness     Decreased functional mobility      Physician: Peter Gonzalez MD    Visit Date: 11/30/2020    Physician Orders: PT Eval and Treat   Medical Diagnosis from Referral: Strain of lumbar region, initial encounter  Evaluation Date: 8/25/2020  Authorization Period Expiration: 12/31/2020  Plan of Care Expiration: 12/25/2020 (reassessed on 11/30/20)  Visit # / Visits authorized: 6 / 20 (+13 prior)    Time In: 3:15  Time Out: 4:10  Total Billable Time: 40 minutes    Precautions: Standard    Subjective     Pt reports: does not have the muscle spams anymore, but she does notice some tightness to central low back and a sharp pain toward R buttock.  She states she feels like she has taken a step back since the muscle spasms started.  She was compliant with home exercise program.  Response to previous treatment: muscle soreness, but felt looser  Functional change: able to walk and move in bed with increased ease    Pain: 6.5/10   Location: central low back pain and B buttock     Objective     Posture:  Fwd head, rounded shoudlers   L sh and iliac crest elevated in standing  Supine: R med malleolus more inferior, R ASIS more inferior    Lumbar Range of Motion:    % Pain   Flexion 75   Pain to R PSIS        Extension 100           Left Side Bending 100 no        Right Side Bending 100 Pain returning to neutral      Left rotation   75 no      Right Rotation   50 no           Lower Extremity Strength  Right LE  Left LE    Knee extension: 5/5 Knee extension: 5/5   Knee flexion: 5/5 Knee flexion: 5/5   Hip flexion: 4+/5 Hip flexion: 5/5   Hip extension:  4/5 pain Hip extension: 4+/5   Hip abduction: 4/5 pain Hip abduction: 4/5   Hip adduction: 5/5 Hip adduction 5/5   Ankle dorsiflexion: 5/5 Ankle dorsiflexion: 5/5   Ankle plantarflexion:  "5/5 Ankle plantarflexion: 5/5       Neuro Dynamic Testing:    Sciatic nerve:      SLR:(-) B     Palpation: TTP to R PSIS, B glute medius, R piriformis, R QL    Sensation: grossly intact to light touch    Flexibility:    Popliteal Angle: R = -20 degrees ; L = -20 degrees      Pt presented to clinic with R anterior innominate  Divya received therapeutic exercises to develop strength, ROM, flexibility and core stabilization for 30 minutes including:  Reassessment  Push/pull (R hip ext, L hip flex)3x3 sec  Review of seated push/pull to perform at work   Modified piriformis stretch 3x20 sec  Supine HS stretch 6x10 sec   Sit to stand from chair x7 (stopped due to pain on last rep, but improved after push/pull)    Not performed due to time:  Standing B shld ext + TA set 3x10 Blue tubing  Squat + W row 2x10  Blue tubing  Pall pot-stirrers + TA set cw/ccw Yellow tubing (2 handles) x15 each   Sidelying shuttle 1 red band 2x10 ea  NP Hip abd walk in walkway x 1 lap (knees straight)  TA set + squat to  10# kettle bell (KB) from floor 2 x 5  10# box (6" step +7# weight =10#) lift from floor to waist x 5  Goblet squat with 10# DB-- 18in block for manual cue x10 (may add foam next visit for cue)  NP Prone quad stretch with strap 3x20"  Push/pull again after exercise and after manual techniques    May add:  progress squatting as tolerated, progress to lifting 10 lb box to/from waist to chest height for work goal    Pt rec'd manual therapy in the form of dry needling x10 min:  Pt cleared of contraindications and verbal and written consent acquired. Pt given option of copy of consent form. Pt educated on benefits and potential side effects of DN. FDN performed to R glute medius. FDN performed to reduce pain and muscle tension, promote blood flow, and improve ROM and function x 10 minutes. Pt tolerated tx well without adverse effects. Pt was educated on what to expect following the procedure and he verbalized understanding. "     Pt received moist heat to low back x 8 minutes in prone position with pillow under hips or support, push/pull post treatment prior to leaving.    Home Exercises Provided and Patient Education Provided     Education provided:   -proper pelvis position  - emphasis on pain free motion with exercises  Pt gave verbal understanding to all education provided    Written Home Exercises Provided: Patient instructed to cont prior HEP.  Exercises were reviewed and Divya was able to demonstrate them prior to the end of the session.  Divya demonstrated good  understanding of the education provided.     See EMR under Patient Instructions for exercises provided 8/25/2020, 9/9/2020, 9/11/2020.    CMS Impairment/Limitation/Restriction for FOTO Lumbar Spine Survey  Status Limitation G-Code CMS Severity Modifier  Intake 46% 54%  Predicted 64% 36% Goal Status+ CJ - At least 20 percent but less than 40 percent  10/5/2020 45% 55%  11/30/2020 45% 55% Current Status CK - At least 40 percent but less than 60 percent    Assessment     Patient reassessed today.  She has improved significantly with increased lumbar ROM.  She presented with more localized pain and tightness to R glute medius today, which loosened with dry needling.  Two visits ago she was making excellent progress towards lifting goals, but had a set back after muscle spasms and increased low back pain beginning a week ago.  She presents today without complaints of muscle spasms and more localized pain compared to last visit.  Will progress pt back to lifting and work tasks as tolerated.  She will benefit from continued progression of functional strengthening as able for improved functional mobility for job tasks.  Divya is progressing well towards her goals.   Pt prognosis is Excellent.     Pt will continue to benefit from skilled outpatient physical therapy to address the deficits listed in the problem list box on initial evaluation, provide pt/family education and to  maximize pt's level of independence in the home and community environment.     Pt's spiritual, cultural and educational needs considered and pt agreeable to plan of care and goals.     Anticipated barriers to physical therapy: work schedule    Goals:   Short Term Goals:  4 weeks   1.Report decreased    Low back    pain  <   / =  4  /10 at its worst  to increase tolerance for ADLs and work activities. (progressing)  2. Pt to increase B popliteal angle by > or = 5 degrees in order to improve flexibility and posture. (progressing)  3. Increased R LE strength by 1/3 muscle grade in all deficient planes to increase tolerance for ADL and work activities. (met)  4. Increased L LE strength by 1/3 muscle grade in all deficient planes to increase tolerance for ADL and work activities. (met)  5. Pt will be independent with push/pull to achieve level pelvis for increased ease with job duties. (met)  6. Pt to tolerate HEP to improve ROM and independence with ADL's (met)  7. Pt will report ability to sleep through the night without back pain. (met)  8. Pt will demonstrate understanding of proper body mechanics for lifting, pushing, and pulling for increased ease with job tasks. (met)     Long Term Goals: 8 weeks   1.Report decreased    Low back    pain  <   / =  1  /10  to increase tolerance for ADLs (progressing)  2.Pt to increase B popliteal angle by > or = 20 degrees in order to improve flexibility and posture. (progressing, not met)  3.Increased B LE strength to 4+/5 to 5/5 in all deficient planes to increase tolerance for ADL and work activities. (partially met)  4. Pt will present with level pelvis and maintain it throughout session for increased ease with ADLs. (progressing, continues to require push/pull)  5.Pt will lift 10-15 box floor to/from waist with good body mechanics without pain for increased ease with job tasks. (progressing, not met)  6. Pt to be Independent with HEP to improve ROM and independence with ADL's  (met)    Unmet goals remain appropriate within 4 weeks.      Plan     Continue PT towards established goals.  Will progress back towards squatting and lifting activities as tolerated.        Jennifer Garcia, PT

## 2020-12-11 ENCOUNTER — CLINICAL SUPPORT (OUTPATIENT)
Dept: REHABILITATION | Facility: HOSPITAL | Age: 30
End: 2020-12-11
Payer: COMMERCIAL

## 2020-12-11 DIAGNOSIS — R26.89 DECREASED FUNCTIONAL MOBILITY: ICD-10-CM

## 2020-12-11 DIAGNOSIS — M62.81 MUSCLE WEAKNESS: ICD-10-CM

## 2020-12-11 PROCEDURE — 97035 APP MDLTY 1+ULTRASOUND EA 15: CPT | Mod: PN,CQ

## 2020-12-11 PROCEDURE — 97110 THERAPEUTIC EXERCISES: CPT | Mod: PN,CQ

## 2020-12-11 PROCEDURE — 97140 MANUAL THERAPY 1/> REGIONS: CPT | Mod: PN,CQ

## 2020-12-11 NOTE — PROGRESS NOTES
"    Updated Plan of Care and Physical Therapy Treatment Note     Name: Divya Wilson  Clinic Number: [id number]    Therapy Diagnosis:   Encounter Diagnoses   Name Primary?    Muscle weakness     Decreased functional mobility      Physician: Peter Gonzalez MD    Visit Date: 12/11/2020    Physician Orders: PT Eval and Treat   Medical Diagnosis from Referral: Strain of lumbar region, initial encounter  Evaluation Date: 8/25/2020  Authorization Period Expiration: 12/31/2020  Plan of Care Expiration: 12/25/2020 (reassessed on 11/23/20)  Visit # / Visits authorized: 7 / 20 (+13 prior)    Time In: 12:59  Time Out: 1:50  Total Billable Time: 45 minutes    Precautions: Standard    Subjective     Pt reports: she was a little sore after last visit on Friday and then she woke up Saturday morning with muscle spasms and pain to low back.  She states she went to [facility] on Saturday, but had to sit most of the time due to pain.  She took muscle relaxors on Sunday and sat with the heat.  She also performed push/pull periodically to decrease pain.  She was compliant with home exercise program.  Response to previous treatment: muscle soreness, but felt looser  Functional change: was doing well until Saturday morning with muscle spasms    Pain: 5/10 upon arrival, 4/10 post treatment  Location: central low back pain and B buttock     Objective       Pt presented to clinic with R anterior innominate  Divya received therapeutic exercises to develop strength, ROM, flexibility and core stabilization for 25 minutes including:  Push/pull (R hip ext, L hip flex)3x3 sec  Contract-relax for B piriformis 3x7 sec each then another push/pull - noted further relief of symptoms  Review of seated push/pull to perform at work   Modified piriformis stretch 3x20 sec  Supine HS stretch 6x10 sec   Standing B shld ext + TA set 3x10 Blue tubing  Squat + W row 2x10  Blue tubing  10# box (6" step +7# weight =10#) lift from floor to waist then " "waist to chest height shelf x 3 then stopped due to onset of dysfunction  Sit to stand from chair x10 (no onset of pain or dysfunction)     Not performed due to time:  Pall pot-stirrers + TA set cw/ccw Yellow tubing (2 handles) x15 each   Sidelying shuttle 1 red band 2x10 ea  NP Hip abd walk in walkway x 1 lap (knees straight)  TA set + squat to  10# kettle bell (KB) from floor 2 x 5  Goblet squat with 10# DB-- 18in block for manual cue x10 (may add foam next visit for cue)  NP Prone quad stretch with strap 3x20"  Push/pull again after exercise and after manual techniques     May add:  progress squatting as tolerated, progress to lifting 10 lb box to/from waist to chest height as able for work goal    Pt rec'd ultrasound (1.0MHz, continuous, 1.2W/cm2) to B glut med/piriformis region x8 min (+2 min setup/cleanup) for decreased pain and increased tissue extensibility.  Prone with folded pillow under hips    Pt rec'd manual therapy x10 min to include:   STM to B distal lumbar paraspinals, B glute medius  Pin and stretch to L piriformis     Pt received moist heat for 5 minutes in prone position with pillow under hips or support, push/pull post treatment prior to leaving.    Home Exercises Provided and Patient Education Provided     Education provided:   -proper pelvis position  - emphasis on pain free motion with exercises  Pt gave verbal understanding to all education provided    Written Home Exercises Provided: Patient instructed to cont prior HEP.  Exercises were reviewed and Divya was able to demonstrate them prior to the end of the session.  Divya demonstrated good  understanding of the education provided.     See EMR under Patient Instructions for exercises provided 8/25/2020, 9/9/2020, 9/11/2020.    Assessment     Divya presents with mild pinching along R SI joint upon arrival which reduced somewhat after push/pull and contract-relax. Further relaxation of muscular tightness and tenderness along B glut " med and distal lumbar paraspinals following manual techniques and ultrasound. Onset of dysfunction with addition of wait to chest height box lift after 3 reps, improved with push/pull. She will benefit from continued progression of functional strengthening as able for improved functional mobility for job tasks.  Divya is progressing well towards her goals.   Pt prognosis is Excellent.     Pt will continue to benefit from skilled outpatient physical therapy to address the deficits listed in the problem list box on initial evaluation, provide pt/family education and to maximize pt's level of independence in the home and community environment.     Pt's spiritual, cultural and educational needs considered and pt agreeable to plan of care and goals.     Anticipated barriers to physical therapy: work schedule    Goals:   Short Term Goals:  4 weeks   1.Report decreased    Low back    pain  <   / =  4  /10 at its worst  to increase tolerance for ADLs and work activities. (progressing)  2. Pt to increase B popliteal angle by > or = 5 degrees in order to improve flexibility and posture. (progressing)  3. Increased R LE strength by 1/3 muscle grade in all deficient planes to increase tolerance for ADL and work activities. (met)  4. Increased L LE strength by 1/3 muscle grade in all deficient planes to increase tolerance for ADL and work activities. (met)  5. Pt will be independent with push/pull to achieve level pelvis for increased ease with job duties. (met)  6. Pt to tolerate HEP to improve ROM and independence with ADL's (met)  7. Pt will report ability to sleep through the night without back pain. (met)  8. Pt will demonstrate understanding of proper body mechanics for lifting, pushing, and pulling for increased ease with job tasks. (met)     Long Term Goals: 8 weeks (in progress, not met)  1.Report decreased    Low back    pain  <   / =  1  /10  to increase tolerance for ADLs  2.Pt to increase B popliteal angle by > or  = 20 degrees in order to improve flexibility and posture.   3.Increased B LE strength to 4+/5 to 5/5 in all deficient planes to increase tolerance for ADL and work activities.  4. Pt will present with level pelvis and maintain it throughout session for increased ease with ADLs.  5.Pt will lift 10-15 box floor to/from waist with good body mechanics without pain for increased ease with job tasks.  6. Pt to be Independent with HEP to improve ROM and independence with ADL's      Plan     Continue with updated POC toward PT goals.    Lisa Estes, PTA

## 2020-12-18 ENCOUNTER — CLINICAL SUPPORT (OUTPATIENT)
Dept: REHABILITATION | Facility: HOSPITAL | Age: 30
End: 2020-12-18
Payer: COMMERCIAL

## 2020-12-18 DIAGNOSIS — M62.81 MUSCLE WEAKNESS: ICD-10-CM

## 2020-12-18 DIAGNOSIS — R26.89 DECREASED FUNCTIONAL MOBILITY: ICD-10-CM

## 2020-12-18 PROCEDURE — 97140 MANUAL THERAPY 1/> REGIONS: CPT | Mod: PN,CQ

## 2020-12-18 PROCEDURE — 97110 THERAPEUTIC EXERCISES: CPT | Mod: PN,CQ

## 2020-12-18 NOTE — PROGRESS NOTES
"    Updated Plan of Care and Physical Therapy Treatment Note     Name: Divya Wilson  Clinic Number: [id number]    Therapy Diagnosis:   Encounter Diagnoses   Name Primary?    Muscle weakness     Decreased functional mobility      Physician: Peter Gonzalez MD    Visit Date: 12/18/2020    Physician Orders: PT Eval and Treat   Medical Diagnosis from Referral: Strain of lumbar region, initial encounter  Evaluation Date: 8/25/2020  Authorization Period Expiration: 12/31/2020  Plan of Care Expiration: 12/25/2020 (reassessed on 11/23/20)  Visit # / Visits authorized: 8 / 20 (+13 prior)    Time In: 2:35  Time Out: 3:25  Total Billable Time: 40 minutes    Precautions: Standard    Subjective     Pt reports: overall doing okay today, still occasional pinch into the back, but loosening overall slowly. Sometimes doing contract-relax to help further relieve pinch.  She was compliant with home exercise program.  Response to previous treatment: muscle soreness, but felt looser  Functional change: was doing well until Saturday morning with muscle spasms    Pain: 4.5/10 upon arrival, 3/10 post treatment  Location: central low back pain and B buttock     Objective       Pt presented to clinic with R anterior innominate  Divya received therapeutic exercises to develop strength, ROM, flexibility and core stabilization for 30 minutes including:  Push/pull (R hip ext, L hip flex)3x3 sec  Contract-relax for B piriformis 3x7 sec each then another push/pull - noted further relief of symptoms  Review of seated push/pull to perform at work   Modified piriformis stretch 3x20 sec  Supine HS stretch 6x10 sec   Standing B shld ext + TA set 3x10 Blue tubing  Squat + W row 2x10  Blue tubing  10# box (6" step +7# weight =10#) lift from floor to waist x 10  10# box waist to chest height shelf x 3 then stopped due to onset of dysfunction  Sit to stand from chair x10 (no onset of pain or dysfunction)     Not performed due to time:  Pall " "pot-stirrers + TA set cw/ccw Yellow tubing (2 handles) x15 each   Sidelying shuttle 1 red band 2x10 ea  NP Hip abd walk in walkway x 1 lap (knees straight)  TA set + squat to  10# kettle bell (KB) from floor 2 x 5  Goblet squat with 10# DB-- 18in block for manual cue x10 (may add foam next visit for cue)  NP Prone quad stretch with strap 3x20"  Push/pull again after exercise and after manual techniques     May add:  progress squatting as tolerated, progress to lifting 10 lb box to/from waist to chest height as able for work goal    (NP due to decreased symptoms) Pt rec'd ultrasound (1.0MHz, continuous, 1.2W/cm2) to B glut med/piriformis region x8 min (+2 min setup/cleanup) for decreased pain and increased tissue extensibility.  Prone with folded pillow under hips    Pt rec'd manual therapy x10 min to include:   STM to B distal lumbar paraspinals, B glute medius  Pin and stretch to L piriformis     Pt received moist heat for 10 minutes in prone position with pillow under hips or support, push/pull post treatment prior to leaving.    Home Exercises Provided and Patient Education Provided     Education provided:   -proper pelvis position  - emphasis on pain free motion with exercises  Pt gave verbal understanding to all education provided    Written Home Exercises Provided: Patient instructed to cont prior HEP.  Exercises were reviewed and Divya was able to demonstrate them prior to the end of the session.  Divya demonstrated good  understanding of the education provided.     See EMR under Patient Instructions for exercises provided 8/25/2020, 9/9/2020, 9/11/2020.    Assessment     Divya demonstrates improved symptoms to the low back, mild tenderness along the R sacral border into the glut med and piriformis but less in comparison to previous session. Pt able to perform greater number of box lifts from waist to shoulder height without onset of dysfunction or lumbar pain. Patient continues to avoid lifting " heavier bags of feed as well as the overhead motions at work but strength overall is improving. She will benefit from continued progression of functional strengthening as able for improved functional mobility for job tasks.  Divya is progressing well towards her goals.   Pt prognosis is Excellent.     Pt will continue to benefit from skilled outpatient physical therapy to address the deficits listed in the problem list box on initial evaluation, provide pt/family education and to maximize pt's level of independence in the home and community environment.     Pt's spiritual, cultural and educational needs considered and pt agreeable to plan of care and goals.     Anticipated barriers to physical therapy: work schedule    Goals:   Short Term Goals:  4 weeks   1.Report decreased    Low back    pain  <   / =  4  /10 at its worst  to increase tolerance for ADLs and work activities. (progressing)  2. Pt to increase B popliteal angle by > or = 5 degrees in order to improve flexibility and posture. (progressing)  3. Increased R LE strength by 1/3 muscle grade in all deficient planes to increase tolerance for ADL and work activities. (met)  4. Increased L LE strength by 1/3 muscle grade in all deficient planes to increase tolerance for ADL and work activities. (met)  5. Pt will be independent with push/pull to achieve level pelvis for increased ease with job duties. (met)  6. Pt to tolerate HEP to improve ROM and independence with ADL's (met)  7. Pt will report ability to sleep through the night without back pain. (met)  8. Pt will demonstrate understanding of proper body mechanics for lifting, pushing, and pulling for increased ease with job tasks. (met)     Long Term Goals: 8 weeks (in progress, not met)  1.Report decreased    Low back    pain  <   / =  1  /10  to increase tolerance for ADLs  2.Pt to increase B popliteal angle by > or = 20 degrees in order to improve flexibility and posture.   3.Increased B LE strength to  4+/5 to 5/5 in all deficient planes to increase tolerance for ADL and work activities.  4. Pt will present with level pelvis and maintain it throughout session for increased ease with ADLs.  5.Pt will lift 10-15 box floor to/from waist with good body mechanics without pain for increased ease with job tasks.  6. Pt to be Independent with HEP to improve ROM and independence with ADL's      Plan     Continue with updated POC toward PT goals.    Lisa Estes, PTA

## 2020-12-28 ENCOUNTER — HOSPITAL ENCOUNTER (EMERGENCY)
Facility: HOSPITAL | Age: 30
Discharge: HOME | End: 2020-12-28
Attending: EMERGENCY MEDICINE
Payer: COMMERCIAL

## 2020-12-28 VITALS
BODY MASS INDEX: 36.8 KG/M2 | TEMPERATURE: 99 F | DIASTOLIC BLOOD PRESSURE: 81 MMHG | WEIGHT: 200 LBS | SYSTOLIC BLOOD PRESSURE: 127 MMHG | HEIGHT: 62 IN | HEART RATE: 81 BPM | RESPIRATION RATE: 17 BRPM | OXYGEN SATURATION: 99 %

## 2020-12-28 DIAGNOSIS — H53.8 BLURRY VISION, BILATERAL: Primary | ICD-10-CM

## 2020-12-28 DIAGNOSIS — H43.391 FLOATERS IN VISUAL FIELD, RIGHT: ICD-10-CM

## 2020-12-28 DIAGNOSIS — H53.412 VISION LOSS, CENTRAL, LEFT: ICD-10-CM

## 2020-12-28 LAB — POCT GLUCOSE: 98 MG/DL (ref 70–110)

## 2020-12-28 PROCEDURE — 82962 GLUCOSE BLOOD TEST: CPT

## 2020-12-28 PROCEDURE — 99283 EMERGENCY DEPT VISIT LOW MDM: CPT

## 2020-12-28 PROCEDURE — 25000003 PHARM REV CODE 250: Performed by: PHYSICIAN ASSISTANT

## 2020-12-28 RX ORDER — TETRACAINE HYDROCHLORIDE 5 MG/ML
2 SOLUTION OPHTHALMIC
Status: COMPLETED | OUTPATIENT
Start: 2020-12-28 | End: 2020-12-28

## 2020-12-28 RX ADMIN — TETRACAINE HYDROCHLORIDE 2 DROP: 5 SOLUTION OPHTHALMIC at 05:12

## 2020-12-28 RX ADMIN — FLUORESCEIN SODIUM 1 EACH: 1 STRIP OPHTHALMIC at 05:12

## 2020-12-28 NOTE — ED NOTES
"30 y.o. female to ED with c.o. gray "blob" in the middle of her vision on the left eye and black speckles in her left visual field in the right eye and blurred vision bilateral. Patient reports the symptoms started 2 days ago. Patient also c.o. intermittent headache since Saturday. Patient denies head/ face injury, denies headache currently, patient endorses generalized fatigue, denies all other medical complaints.   "

## 2020-12-28 NOTE — FIRST PROVIDER EVALUATION
" Emergency Department TeleTriage Encounter Note      CHIEF COMPLAINT    Chief Complaint   Patient presents with    Eye Problem     Pt states "I'm seeing a big black spot in left eye and in my right eye I see smaller dots". Started 3 days ago. Reports some blurred vision. Denies pain    Blurred Vision       VITAL SIGNS   Initial Vitals [12/28/20 1600]   BP Pulse Resp Temp SpO2   134/88 109 18 99 °F (37.2 °C) 98 %      MAP       --            ALLERGIES    Review of patient's allergies indicates:   Allergen Reactions    Ceclor [cefaclor] Anaphylaxis       PROVIDER TRIAGE NOTE  This is a teletriage evaluation of a 30 y.o. female presenting to the ED with c/o left eye blurriness and right eye floaters.  Pt states she woke up Saturday with symptoms.  No eye pain.  Pt states Friday night she had an episode of tremors.       Initial orders will be placed and care will be transferred to an alternate provider when patient is roomed for a full evaluation. Any additional orders and the final disposition will be determined by that provider.         ORDERS  Labs Reviewed - No data to display    ED Orders (720h ago, onward)    Start Ordered     Status Ordering Provider    12/28/20 1643 12/28/20 1642  Visual acuity screening  Once      Ordered RENAE HYATT            Virtual Visit Note: The provider triage portion of this emergency department evaluation and documentation was performed via [employer], a HIPAA-compliant telemedicine application, in concert with a tele-presenter in the room. A face to face patient evaluation with one of my colleagues will occur once the patient is placed in an emergency department room.      DISCLAIMER: This note was prepared with [employer] voice recognition transcription software. Garbled syntax, mangled pronouns, and other bizarre constructions may be attributed to that software system.  "

## 2020-12-29 ENCOUNTER — OFFICE VISIT (OUTPATIENT)
Dept: OPHTHALMOLOGY | Facility: CLINIC | Age: 30
End: 2020-12-29
Payer: COMMERCIAL

## 2020-12-29 ENCOUNTER — LAB VISIT (OUTPATIENT)
Dept: LAB | Facility: HOSPITAL | Age: 30
End: 2020-12-29
Attending: OPHTHALMOLOGY
Payer: COMMERCIAL

## 2020-12-29 DIAGNOSIS — H46.9 OPTIC NEURITIS, LEFT: Primary | ICD-10-CM

## 2020-12-29 DIAGNOSIS — H46.9 OPTIC NEURITIS, LEFT: ICD-10-CM

## 2020-12-29 DIAGNOSIS — H35.81 MACULAR EDEMA OF LEFT EYE: ICD-10-CM

## 2020-12-29 LAB
BASOPHILS # BLD AUTO: 0.06 K/UL (ref 0–0.2)
BASOPHILS NFR BLD: 0.8 % (ref 0–1.9)
CRP SERPL-MCNC: 18.7 MG/L (ref 0–8.2)
DIFFERENTIAL METHOD: ABNORMAL
EOSINOPHIL # BLD AUTO: 0.1 K/UL (ref 0–0.5)
EOSINOPHIL NFR BLD: 1.8 % (ref 0–8)
ERYTHROCYTE [DISTWIDTH] IN BLOOD BY AUTOMATED COUNT: 12.3 % (ref 11.5–14.5)
ERYTHROCYTE [SEDIMENTATION RATE] IN BLOOD BY WESTERGREN METHOD: 37 MM/HR (ref 0–20)
HCT VFR BLD AUTO: 40.2 % (ref 37–48.5)
HGB BLD-MCNC: 12.6 G/DL (ref 12–16)
IMM GRANULOCYTES # BLD AUTO: 0.04 K/UL (ref 0–0.04)
IMM GRANULOCYTES NFR BLD AUTO: 0.6 % (ref 0–0.5)
LYMPHOCYTES # BLD AUTO: 2.8 K/UL (ref 1–4.8)
LYMPHOCYTES NFR BLD: 40.1 % (ref 18–48)
MCH RBC QN AUTO: 28.7 PG (ref 27–31)
MCHC RBC AUTO-ENTMCNC: 31.3 G/DL (ref 32–36)
MCV RBC AUTO: 92 FL (ref 82–98)
MONOCYTES # BLD AUTO: 0.6 K/UL (ref 0.3–1)
MONOCYTES NFR BLD: 8.2 % (ref 4–15)
NEUTROPHILS # BLD AUTO: 3.4 K/UL (ref 1.8–7.7)
NEUTROPHILS NFR BLD: 48.5 % (ref 38–73)
NRBC BLD-RTO: 0 /100 WBC
PLATELET # BLD AUTO: 355 K/UL (ref 150–350)
PMV BLD AUTO: 10.4 FL (ref 9.2–12.9)
RBC # BLD AUTO: 4.39 M/UL (ref 4–5.4)
RPR SER QL: NORMAL
WBC # BLD AUTO: 7.06 K/UL (ref 3.9–12.7)

## 2020-12-29 PROCEDURE — 99999 PR PBB SHADOW E&M-EST. PATIENT-LVL II: CPT | Mod: PBBFAC,,, | Performed by: OPHTHALMOLOGY

## 2020-12-29 PROCEDURE — 85651 RBC SED RATE NONAUTOMATED: CPT

## 2020-12-29 PROCEDURE — 1126F AMNT PAIN NOTED NONE PRSNT: CPT | Mod: S$GLB,,, | Performed by: OPHTHALMOLOGY

## 2020-12-29 PROCEDURE — 92004 COMPRE OPH EXAM NEW PT 1/>: CPT | Mod: S$GLB,,, | Performed by: OPHTHALMOLOGY

## 2020-12-29 PROCEDURE — 92133 POSTERIOR SEGMENT OCT OPTIC NERVE(OCULAR COHERENCE TOMOGRAPHY) - OU - BOTH EYES: ICD-10-PCS | Mod: S$GLB,,, | Performed by: OPHTHALMOLOGY

## 2020-12-29 PROCEDURE — 86611 BARTONELLA ANTIBODY: CPT | Mod: 59

## 2020-12-29 PROCEDURE — 85025 COMPLETE CBC W/AUTO DIFF WBC: CPT

## 2020-12-29 PROCEDURE — 86780 TREPONEMA PALLIDUM: CPT

## 2020-12-29 PROCEDURE — 99999 PR PBB SHADOW E&M-EST. PATIENT-LVL II: ICD-10-PCS | Mod: PBBFAC,,, | Performed by: OPHTHALMOLOGY

## 2020-12-29 PROCEDURE — 86140 C-REACTIVE PROTEIN: CPT

## 2020-12-29 PROCEDURE — 36415 COLL VENOUS BLD VENIPUNCTURE: CPT

## 2020-12-29 PROCEDURE — 1126F PR PAIN SEVERITY QUANTIFIED, NO PAIN PRESENT: ICD-10-PCS | Mod: S$GLB,,, | Performed by: OPHTHALMOLOGY

## 2020-12-29 PROCEDURE — 92133 CPTRZD OPH DX IMG PST SGM ON: CPT | Mod: S$GLB,,, | Performed by: OPHTHALMOLOGY

## 2020-12-29 PROCEDURE — 86592 SYPHILIS TEST NON-TREP QUAL: CPT

## 2020-12-29 PROCEDURE — 92004 PR EYE EXAM, NEW PATIENT,COMPREHESV: ICD-10-PCS | Mod: S$GLB,,, | Performed by: OPHTHALMOLOGY

## 2020-12-29 RX ORDER — AZITHROMYCIN 250 MG/1
TABLET, FILM COATED ORAL
Qty: 6 TABLET | Refills: 0 | Status: SHIPPED | OUTPATIENT
Start: 2020-12-29 | End: 2021-01-03

## 2020-12-29 NOTE — ED PROVIDER NOTES
"Encounter Date: 12/28/2020       History     Chief Complaint   Patient presents with    Eye Problem     Pt states "I'm seeing a big black spot in left eye and in my right eye I see smaller dots". Started 3 days ago. Reports some blurred vision. Denies pain    Blurred Vision     30-year-old female with 3 day history constant, painless OS central visual field loss, OD sparkling floaters, OU blurred vision.    No trauma.  No headache. No eye pain. No pruritus. No drainage. No photophobia. No curtain falling over field of vision. No complete loss of vision. No painful EOMs or restriction of EOMs. No hx glaucoma. No eye redness. No facial rash. No hx thrombophilia. No hx similar symptoms. No contact lens wear. No unilateral weakness, slurred speech, facial droop, confusion, unsteady gait, or hx tia/cva. No neck pain/stiffness.         Review of patient's allergies indicates:   Allergen Reactions    Ceclor [cefaclor] Anaphylaxis     History reviewed. No pertinent past medical history.  Past Surgical History:   Procedure Laterality Date    HYSTERECTOMY       Family History   Problem Relation Age of Onset    Cancer Maternal Grandmother      Social History     Tobacco Use    Smoking status: Never Smoker    Smokeless tobacco: Never Used   Substance Use Topics    Alcohol use: Not on file    Drug use: Not on file     Review of Systems   HENT: Negative for facial swelling.    Eyes: Positive for visual disturbance. Negative for photophobia, pain, discharge, redness and itching.   Skin: Negative for rash.   Neurological: Negative for weakness, light-headedness, numbness and headaches.       Physical Exam     Initial Vitals [12/28/20 1600]   BP Pulse Resp Temp SpO2   134/88 109 18 99 °F (37.2 °C) 98 %      MAP       --         Physical Exam    Nursing note and vitals reviewed.  Constitutional: She appears well-developed and well-nourished. She is not diaphoretic. No distress.   HENT:   Head: Normocephalic and atraumatic. "   Eyes:   No periorbital swelling or erythema. No rash or vesicular lesions near eye. No proptosis. Full EOMs bilaterally without pain; no nystagmus. PERRLA. No pain with consensual reflex. No scleral hyperemia or subconjunctival hemorrhage. No fluorescein uptake. No dendritic lesions. Negative Rashida's sign. No anterior chamber bulge. No cloudy cornea. IOP: OD 16, 15, 15. OS 13, 15, 14.  No foreign body. Fundoscopic exam without retinal pallor, torturous retinal vessels, or obvious abnormalities. OD possibly with more pronounced retinal vessels compared to OS.    Neck: Neck supple.   No carotid bruit   Cardiovascular: Intact distal pulses.   Neurological: She is oriented to person, place, and time.   Skin: Skin is warm.   Psychiatric: She has a normal mood and affect. Thought content normal.         ED Course   Procedures  Labs Reviewed   POCT GLUCOSE          Imaging Results    None          Medical Decision Making:   Initial Assessment:   31yo F with OU visual disturbance x 3 days.  Differential Diagnosis:   Retinal artery occlusion, retinal detachment, macular degeneration, glaucoma, corneal abrasion  ED Management:  Spoke with Dr. Cedillo, agrees not emergent, can f/u as outpatient. Will refer to ophthalmology for urgent f/u. ED return precautions discussed.                              Clinical Impression:     ICD-10-CM ICD-9-CM   1. Blurry vision, bilateral  H53.8 368.8   2. Vision loss, central, left  H53.412 368.41   3. Floaters in visual field, right  H43.391 379.24                      Disposition:   Disposition: Discharged  Condition: Stable     ED Disposition Condition    Discharge Stable        ED Prescriptions     None        Follow-up Information     Follow up With Specialties Details Why Contact Karina Cedillo Jr., MD Ophthalmology, Pediatric Ophthalmology Schedule an appointment as soon as possible for a visit   [address] [place] [id number]  [phone]      [facility]  OPHTHALMOLOGY Ophthalmology Schedule an appointment as soon as possible for a visit  For reevaluation [address]  [facility] [address]  [phone]                                       Bird Mueller PA-C  12/29/20 2057

## 2020-12-29 NOTE — LETTER
December 29, 2020      Bird Mueller PA-C  [address] [place] [address]           [facility] - Ophthalmology  [address] [place] [address] [address]  [facility] [address]  Phone: [phone]          Patient: Divya Wilson   MR Number: [id number]   YOB: 1990   Date of Visit: 12/29/2020       Dear Bird Mueller:    Thank you for referring Divya Wilson to me for evaluation. Attached you will find relevant portions of my assessment and plan of care.    If you have questions, please do not hesitate to call me. I look forward to following Divya Wilson along with you.    Sincerely,    Dontrell Angela MD    Enclosure  CC:  No Recipients    If you would like to receive this communication electronically, please contact [email or web address] or [phone] to request more information on [employer] access.    For providers and/or their staff who would like to refer a patient to [facility], please contact us through our one-stop-shop provider referral line, [facility], at [phone].    If you feel you have received this communication in error or would no longer like to receive these types of communications, please e-mail [email or web address]

## 2020-12-29 NOTE — DISCHARGE INSTRUCTIONS
Follow-up with ophthalmology for re-evaluation of eye complaints.    Return to this ED if you begin with severe headache, worsening loss of vision, if any other problems occur.
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
## 2020-12-30 ENCOUNTER — LAB VISIT (OUTPATIENT)
Dept: LAB | Facility: HOSPITAL | Age: 30
End: 2020-12-30
Attending: OPHTHALMOLOGY
Payer: COMMERCIAL

## 2020-12-30 DIAGNOSIS — H46.9 OPTIC NEURITIS, LEFT: ICD-10-CM

## 2020-12-30 LAB
HCG SERPL QL: NEGATIVE
T PALLIDUM AB SER QL IF: NORMAL

## 2020-12-30 PROCEDURE — 86235 NUCLEAR ANTIGEN ANTIBODY: CPT | Mod: 59

## 2020-12-30 PROCEDURE — 84703 CHORIONIC GONADOTROPIN ASSAY: CPT

## 2020-12-30 PROCEDURE — 86039 ANTINUCLEAR ANTIBODIES (ANA): CPT

## 2020-12-30 PROCEDURE — 86038 ANTINUCLEAR ANTIBODIES: CPT

## 2020-12-31 LAB
ANA PATTERN 1: NORMAL
ANA SER QL IF: POSITIVE
ANA TITR SER IF: NORMAL {TITER}

## 2020-12-31 NOTE — PROGRESS NOTES
Called patient today (12/31) to review labs and discuss symptoms.  Left VM for patient to call back.      Plan is to continue current treatment (azithromycin).  May consider adding steroids when we see patient next week.

## 2021-01-04 ENCOUNTER — CLINICAL SUPPORT (OUTPATIENT)
Dept: REHABILITATION | Facility: HOSPITAL | Age: 31
End: 2021-01-04
Payer: COMMERCIAL

## 2021-01-04 DIAGNOSIS — R26.89 DECREASED FUNCTIONAL MOBILITY: ICD-10-CM

## 2021-01-04 DIAGNOSIS — M62.81 MUSCLE WEAKNESS: ICD-10-CM

## 2021-01-04 PROCEDURE — 97110 THERAPEUTIC EXERCISES: CPT | Mod: PN

## 2021-01-05 ENCOUNTER — OFFICE VISIT (OUTPATIENT)
Dept: OPHTHALMOLOGY | Facility: CLINIC | Age: 31
End: 2021-01-05
Payer: COMMERCIAL

## 2021-01-05 DIAGNOSIS — H30.92 NEURORETINITIS, LEFT: Primary | ICD-10-CM

## 2021-01-05 LAB
ANTI SM ANTIBODY: 0.07 RATIO (ref 0–0.99)
ANTI SM/RNP ANTIBODY: 0.06 RATIO (ref 0–0.99)
ANTI-SM INTERPRETATION: NEGATIVE
ANTI-SM/RNP INTERPRETATION: NEGATIVE
ANTI-SSA ANTIBODY: 0.06 RATIO (ref 0–0.99)
ANTI-SSA INTERPRETATION: NEGATIVE
ANTI-SSB ANTIBODY: 0.07 RATIO (ref 0–0.99)
ANTI-SSB INTERPRETATION: NEGATIVE
B HENSELAE IGG SER QL: POSITIVE
B HENSELAE IGG TITR SER IF: ABNORMAL {TITER}
B HENSELAE IGM SER QL: POSITIVE
B HENSELAE IGM TITR SER IF: ABNORMAL {TITER}
DSDNA AB SER-ACNC: NORMAL [IU]/ML

## 2021-01-05 PROCEDURE — 99999 PR PBB SHADOW E&M-EST. PATIENT-LVL II: ICD-10-PCS | Mod: PBBFAC,,, | Performed by: OPHTHALMOLOGY

## 2021-01-05 PROCEDURE — 92014 PR EYE EXAM, EST PATIENT,COMPREHESV: ICD-10-PCS | Mod: S$GLB,,, | Performed by: OPHTHALMOLOGY

## 2021-01-05 PROCEDURE — 1125F PR PAIN SEVERITY QUANTIFIED, PAIN PRESENT: ICD-10-PCS | Mod: S$GLB,,, | Performed by: OPHTHALMOLOGY

## 2021-01-05 PROCEDURE — 99999 PR PBB SHADOW E&M-EST. PATIENT-LVL II: CPT | Mod: PBBFAC,,, | Performed by: OPHTHALMOLOGY

## 2021-01-05 PROCEDURE — 92250 FUNDUS PHOTOGRAPHY W/I&R: CPT | Mod: S$GLB,,, | Performed by: OPHTHALMOLOGY

## 2021-01-05 PROCEDURE — 1125F AMNT PAIN NOTED PAIN PRSNT: CPT | Mod: S$GLB,,, | Performed by: OPHTHALMOLOGY

## 2021-01-05 PROCEDURE — 92014 COMPRE OPH EXAM EST PT 1/>: CPT | Mod: S$GLB,,, | Performed by: OPHTHALMOLOGY

## 2021-01-05 PROCEDURE — 92250 COLOR FUNDUS PHOTOGRAPHY - OU - BOTH EYES: ICD-10-PCS | Mod: S$GLB,,, | Performed by: OPHTHALMOLOGY

## 2021-01-05 RX ORDER — AZITHROMYCIN 250 MG/1
250 TABLET, FILM COATED ORAL DAILY
Qty: 6 TABLET | Refills: 0 | Status: SHIPPED | OUTPATIENT
Start: 2021-01-05 | End: 2021-01-11

## 2021-01-05 RX ORDER — PREDNISONE 20 MG/1
TABLET ORAL
Qty: 12 TABLET | Refills: 1 | Status: SHIPPED | OUTPATIENT
Start: 2021-01-05 | End: 2021-01-26

## 2021-01-19 ENCOUNTER — CLINICAL SUPPORT (OUTPATIENT)
Dept: REHABILITATION | Facility: HOSPITAL | Age: 31
End: 2021-01-19
Payer: COMMERCIAL

## 2021-01-19 DIAGNOSIS — R26.89 DECREASED FUNCTIONAL MOBILITY: ICD-10-CM

## 2021-01-19 DIAGNOSIS — M62.81 MUSCLE WEAKNESS: ICD-10-CM

## 2021-01-19 PROCEDURE — 97110 THERAPEUTIC EXERCISES: CPT | Mod: PN

## 2021-01-25 ENCOUNTER — CLINICAL SUPPORT (OUTPATIENT)
Dept: REHABILITATION | Facility: HOSPITAL | Age: 31
End: 2021-01-25
Payer: COMMERCIAL

## 2021-01-25 DIAGNOSIS — M62.81 MUSCLE WEAKNESS: ICD-10-CM

## 2021-01-25 DIAGNOSIS — R26.89 DECREASED FUNCTIONAL MOBILITY: ICD-10-CM

## 2021-01-25 PROCEDURE — 97110 THERAPEUTIC EXERCISES: CPT | Mod: PN

## 2021-01-26 ENCOUNTER — OFFICE VISIT (OUTPATIENT)
Dept: OPHTHALMOLOGY | Facility: CLINIC | Age: 31
End: 2021-01-26
Payer: COMMERCIAL

## 2021-01-26 DIAGNOSIS — H30.92 NEURORETINITIS, LEFT: Primary | ICD-10-CM

## 2021-01-26 PROCEDURE — 99213 PR OFFICE/OUTPT VISIT, EST, LEVL III, 20-29 MIN: ICD-10-PCS | Mod: S$GLB,,, | Performed by: OPHTHALMOLOGY

## 2021-01-26 PROCEDURE — 99999 PR PBB SHADOW E&M-EST. PATIENT-LVL II: ICD-10-PCS | Mod: PBBFAC,,, | Performed by: OPHTHALMOLOGY

## 2021-01-26 PROCEDURE — 1126F PR PAIN SEVERITY QUANTIFIED, NO PAIN PRESENT: ICD-10-PCS | Mod: S$GLB,,, | Performed by: OPHTHALMOLOGY

## 2021-01-26 PROCEDURE — 1126F AMNT PAIN NOTED NONE PRSNT: CPT | Mod: S$GLB,,, | Performed by: OPHTHALMOLOGY

## 2021-01-26 PROCEDURE — 99999 PR PBB SHADOW E&M-EST. PATIENT-LVL II: CPT | Mod: PBBFAC,,, | Performed by: OPHTHALMOLOGY

## 2021-01-26 PROCEDURE — 99213 OFFICE O/P EST LOW 20 MIN: CPT | Mod: S$GLB,,, | Performed by: OPHTHALMOLOGY

## 2021-03-09 ENCOUNTER — OFFICE VISIT (OUTPATIENT)
Dept: OPHTHALMOLOGY | Facility: CLINIC | Age: 31
End: 2021-03-09
Payer: COMMERCIAL

## 2021-03-09 DIAGNOSIS — H30.92 NEURORETINITIS, LEFT: Primary | ICD-10-CM

## 2021-03-09 PROCEDURE — 1126F PR PAIN SEVERITY QUANTIFIED, NO PAIN PRESENT: ICD-10-PCS | Mod: S$GLB,,, | Performed by: OPHTHALMOLOGY

## 2021-03-09 PROCEDURE — 99999 PR PBB SHADOW E&M-EST. PATIENT-LVL II: CPT | Mod: PBBFAC,,, | Performed by: OPHTHALMOLOGY

## 2021-03-09 PROCEDURE — 92014 PR EYE EXAM, EST PATIENT,COMPREHESV: ICD-10-PCS | Mod: S$GLB,,, | Performed by: OPHTHALMOLOGY

## 2021-03-09 PROCEDURE — 92134 CPTRZ OPH DX IMG PST SGM RTA: CPT | Mod: S$GLB,,, | Performed by: OPHTHALMOLOGY

## 2021-03-09 PROCEDURE — 99999 PR PBB SHADOW E&M-EST. PATIENT-LVL II: ICD-10-PCS | Mod: PBBFAC,,, | Performed by: OPHTHALMOLOGY

## 2021-03-09 PROCEDURE — 1126F AMNT PAIN NOTED NONE PRSNT: CPT | Mod: S$GLB,,, | Performed by: OPHTHALMOLOGY

## 2021-03-09 PROCEDURE — 92134 POSTERIOR SEGMENT OCT RETINA (OCULAR COHERENCE TOMOGRAPHY)-BOTH EYES: ICD-10-PCS | Mod: S$GLB,,, | Performed by: OPHTHALMOLOGY

## 2021-03-09 PROCEDURE — 92014 COMPRE OPH EXAM EST PT 1/>: CPT | Mod: S$GLB,,, | Performed by: OPHTHALMOLOGY

## 2021-04-29 ENCOUNTER — PATIENT MESSAGE (OUTPATIENT)
Dept: RESEARCH | Facility: HOSPITAL | Age: 31
End: 2021-04-29

## 2021-05-24 ENCOUNTER — OFFICE VISIT (OUTPATIENT)
Dept: FAMILY MEDICINE | Facility: CLINIC | Age: 31
End: 2021-05-24
Payer: COMMERCIAL

## 2021-05-24 VITALS
SYSTOLIC BLOOD PRESSURE: 122 MMHG | WEIGHT: 210.63 LBS | DIASTOLIC BLOOD PRESSURE: 86 MMHG | TEMPERATURE: 99 F | BODY MASS INDEX: 38.53 KG/M2 | OXYGEN SATURATION: 97 % | RESPIRATION RATE: 18 BRPM | HEART RATE: 97 BPM

## 2021-05-24 DIAGNOSIS — H53.9 VISUAL DISTURBANCE OF ONE EYE: Primary | ICD-10-CM

## 2021-05-24 DIAGNOSIS — R11.2 NAUSEA AND VOMITING, INTRACTABILITY OF VOMITING NOT SPECIFIED, UNSPECIFIED VOMITING TYPE: ICD-10-CM

## 2021-05-24 LAB
ALBUMIN SERPL BCP-MCNC: 4 G/DL (ref 3.5–5.2)
ALP SERPL-CCNC: 68 U/L (ref 55–135)
ALT SERPL W/O P-5'-P-CCNC: 18 U/L (ref 10–44)
AMYLASE SERPL-CCNC: 80 U/L (ref 20–110)
ANION GAP SERPL CALC-SCNC: 9 MMOL/L (ref 8–16)
AST SERPL-CCNC: 20 U/L (ref 10–40)
BASOPHILS # BLD AUTO: 0.04 K/UL (ref 0–0.2)
BASOPHILS NFR BLD: 0.5 % (ref 0–1.9)
BILIRUB SERPL-MCNC: 0.3 MG/DL (ref 0.1–1)
BUN SERPL-MCNC: 8 MG/DL (ref 6–20)
CALCIUM SERPL-MCNC: 9.7 MG/DL (ref 8.7–10.5)
CHLORIDE SERPL-SCNC: 106 MMOL/L (ref 95–110)
CO2 SERPL-SCNC: 26 MMOL/L (ref 23–29)
CREAT SERPL-MCNC: 0.8 MG/DL (ref 0.5–1.4)
DIFFERENTIAL METHOD: NORMAL
EOSINOPHIL # BLD AUTO: 0.1 K/UL (ref 0–0.5)
EOSINOPHIL NFR BLD: 0.6 % (ref 0–8)
ERYTHROCYTE [DISTWIDTH] IN BLOOD BY AUTOMATED COUNT: 13.3 % (ref 11.5–14.5)
EST. GFR  (AFRICAN AMERICAN): >60 ML/MIN/1.73 M^2
EST. GFR  (NON AFRICAN AMERICAN): >60 ML/MIN/1.73 M^2
GLUCOSE SERPL-MCNC: 119 MG/DL (ref 70–110)
HCT VFR BLD AUTO: 40.7 % (ref 37–48.5)
HGB BLD-MCNC: 13.3 G/DL (ref 12–16)
IMM GRANULOCYTES # BLD AUTO: 0.03 K/UL (ref 0–0.04)
IMM GRANULOCYTES NFR BLD AUTO: 0.4 % (ref 0–0.5)
LIPASE SERPL-CCNC: 16 U/L (ref 4–60)
LYMPHOCYTES # BLD AUTO: 2.4 K/UL (ref 1–4.8)
LYMPHOCYTES NFR BLD: 30.1 % (ref 18–48)
MCH RBC QN AUTO: 28.7 PG (ref 27–31)
MCHC RBC AUTO-ENTMCNC: 32.7 G/DL (ref 32–36)
MCV RBC AUTO: 88 FL (ref 82–98)
MONOCYTES # BLD AUTO: 0.5 K/UL (ref 0.3–1)
MONOCYTES NFR BLD: 6.8 % (ref 4–15)
NEUTROPHILS # BLD AUTO: 4.8 K/UL (ref 1.8–7.7)
NEUTROPHILS NFR BLD: 61.6 % (ref 38–73)
NRBC BLD-RTO: 0 /100 WBC
PLATELET # BLD AUTO: 346 K/UL (ref 150–450)
PMV BLD AUTO: 10.9 FL (ref 9.2–12.9)
POTASSIUM SERPL-SCNC: 4 MMOL/L (ref 3.5–5.1)
PROT SERPL-MCNC: 8 G/DL (ref 6–8.4)
RBC # BLD AUTO: 4.64 M/UL (ref 4–5.4)
SODIUM SERPL-SCNC: 141 MMOL/L (ref 136–145)
WBC # BLD AUTO: 7.81 K/UL (ref 3.9–12.7)

## 2021-05-24 PROCEDURE — 81003 URINALYSIS AUTO W/O SCOPE: CPT | Performed by: NURSE PRACTITIONER

## 2021-05-24 PROCEDURE — 3008F PR BODY MASS INDEX (BMI) DOCUMENTED: ICD-10-PCS | Mod: CPTII,S$GLB,, | Performed by: NURSE PRACTITIONER

## 2021-05-24 PROCEDURE — 3008F BODY MASS INDEX DOCD: CPT | Mod: CPTII,S$GLB,, | Performed by: NURSE PRACTITIONER

## 2021-05-24 PROCEDURE — 1125F AMNT PAIN NOTED PAIN PRSNT: CPT | Mod: S$GLB,,, | Performed by: NURSE PRACTITIONER

## 2021-05-24 PROCEDURE — 80053 COMPREHEN METABOLIC PANEL: CPT | Performed by: NURSE PRACTITIONER

## 2021-05-24 PROCEDURE — 85025 COMPLETE CBC W/AUTO DIFF WBC: CPT | Performed by: NURSE PRACTITIONER

## 2021-05-24 PROCEDURE — 99214 PR OFFICE/OUTPT VISIT, EST, LEVL IV, 30-39 MIN: ICD-10-PCS | Mod: S$GLB,,, | Performed by: NURSE PRACTITIONER

## 2021-05-24 PROCEDURE — 82150 ASSAY OF AMYLASE: CPT | Performed by: NURSE PRACTITIONER

## 2021-05-24 PROCEDURE — 1125F PR PAIN SEVERITY QUANTIFIED, PAIN PRESENT: ICD-10-PCS | Mod: S$GLB,,, | Performed by: NURSE PRACTITIONER

## 2021-05-24 PROCEDURE — 83690 ASSAY OF LIPASE: CPT | Performed by: NURSE PRACTITIONER

## 2021-05-24 PROCEDURE — 99214 OFFICE O/P EST MOD 30 MIN: CPT | Mod: S$GLB,,, | Performed by: NURSE PRACTITIONER

## 2021-05-24 RX ORDER — ONDANSETRON HYDROCHLORIDE 8 MG/1
8 TABLET, FILM COATED ORAL EVERY 8 HOURS PRN
Qty: 21 TABLET | Refills: 0 | Status: SHIPPED | OUTPATIENT
Start: 2021-05-24 | End: 2021-05-31

## 2021-05-24 RX ORDER — FAMOTIDINE 40 MG/1
40 TABLET, FILM COATED ORAL DAILY
Qty: 7 TABLET | Refills: 0 | Status: SHIPPED | OUTPATIENT
Start: 2021-05-24 | End: 2021-05-31

## 2021-05-25 LAB
BILIRUB UR QL STRIP: NEGATIVE
CLARITY UR REFRACT.AUTO: CLEAR
COLOR UR AUTO: YELLOW
GLUCOSE UR QL STRIP: NEGATIVE
HGB UR QL STRIP: NEGATIVE
KETONES UR QL STRIP: NEGATIVE
LEUKOCYTE ESTERASE UR QL STRIP: NEGATIVE
NITRITE UR QL STRIP: NEGATIVE
PH UR STRIP: 7 [PH] (ref 5–8)
PROT UR QL STRIP: NEGATIVE
SP GR UR STRIP: 1.01 (ref 1–1.03)
URN SPEC COLLECT METH UR: NORMAL

## 2021-05-27 ENCOUNTER — TELEPHONE (OUTPATIENT)
Dept: FAMILY MEDICINE | Facility: CLINIC | Age: 31
End: 2021-05-27

## 2021-05-31 ENCOUNTER — OFFICE VISIT (OUTPATIENT)
Dept: FAMILY MEDICINE | Facility: CLINIC | Age: 31
End: 2021-05-31
Payer: COMMERCIAL

## 2021-05-31 VITALS
HEIGHT: 62 IN | HEART RATE: 76 BPM | BODY MASS INDEX: 38.15 KG/M2 | DIASTOLIC BLOOD PRESSURE: 78 MMHG | OXYGEN SATURATION: 97 % | WEIGHT: 207.31 LBS | TEMPERATURE: 98 F | SYSTOLIC BLOOD PRESSURE: 124 MMHG | RESPIRATION RATE: 20 BRPM

## 2021-05-31 DIAGNOSIS — R10.9 ABDOMINAL PAIN, ACUTE: Primary | ICD-10-CM

## 2021-05-31 DIAGNOSIS — R10.33 PERIUMBILICAL ABDOMINAL PAIN: ICD-10-CM

## 2021-05-31 PROCEDURE — 3008F PR BODY MASS INDEX (BMI) DOCUMENTED: ICD-10-PCS | Mod: CPTII,S$GLB,, | Performed by: PHYSICIAN ASSISTANT

## 2021-05-31 PROCEDURE — 1125F AMNT PAIN NOTED PAIN PRSNT: CPT | Mod: S$GLB,,, | Performed by: PHYSICIAN ASSISTANT

## 2021-05-31 PROCEDURE — 99214 PR OFFICE/OUTPT VISIT, EST, LEVL IV, 30-39 MIN: ICD-10-PCS | Mod: S$GLB,,, | Performed by: PHYSICIAN ASSISTANT

## 2021-05-31 PROCEDURE — 3008F BODY MASS INDEX DOCD: CPT | Mod: CPTII,S$GLB,, | Performed by: PHYSICIAN ASSISTANT

## 2021-05-31 PROCEDURE — 1125F PR PAIN SEVERITY QUANTIFIED, PAIN PRESENT: ICD-10-PCS | Mod: S$GLB,,, | Performed by: PHYSICIAN ASSISTANT

## 2021-05-31 PROCEDURE — 99214 OFFICE O/P EST MOD 30 MIN: CPT | Mod: S$GLB,,, | Performed by: PHYSICIAN ASSISTANT

## 2021-05-31 RX ORDER — DICYCLOMINE HYDROCHLORIDE 10 MG/1
10 CAPSULE ORAL 3 TIMES DAILY
Qty: 40 CAPSULE | Refills: 0 | Status: SHIPPED | OUTPATIENT
Start: 2021-05-31 | End: 2021-06-29

## 2021-05-31 RX ORDER — PANTOPRAZOLE SODIUM 40 MG/1
40 TABLET, DELAYED RELEASE ORAL DAILY
Qty: 30 TABLET | Refills: 11 | Status: SHIPPED | OUTPATIENT
Start: 2021-05-31 | End: 2021-06-29

## 2021-06-02 ENCOUNTER — HOSPITAL ENCOUNTER (OUTPATIENT)
Dept: RADIOLOGY | Facility: HOSPITAL | Age: 31
Discharge: HOME | End: 2021-06-02
Attending: PHYSICIAN ASSISTANT
Payer: COMMERCIAL

## 2021-06-02 ENCOUNTER — IMMUNIZATION (OUTPATIENT)
Dept: FAMILY MEDICINE | Facility: CLINIC | Age: 31
End: 2021-06-02
Payer: COMMERCIAL

## 2021-06-02 DIAGNOSIS — R10.33 PERIUMBILICAL ABDOMINAL PAIN: ICD-10-CM

## 2021-06-02 DIAGNOSIS — Z23 NEED FOR VACCINATION: Primary | ICD-10-CM

## 2021-06-02 PROCEDURE — 91300 COVID-19, MRNA, LNP-S, PF, 30 MCG/0.3 ML DOSE VACCINE: CPT | Mod: PBBFAC | Performed by: FAMILY MEDICINE

## 2021-06-02 PROCEDURE — A9698 NON-RAD CONTRAST MATERIALNOC: HCPCS | Mod: PO | Performed by: PHYSICIAN ASSISTANT

## 2021-06-02 PROCEDURE — 74178 CT ABD&PLV WO CNTR FLWD CNTR: CPT | Mod: TC,PO

## 2021-06-02 PROCEDURE — 25500020 PHARM REV CODE 255: Mod: PO | Performed by: PHYSICIAN ASSISTANT

## 2021-06-02 PROCEDURE — 74178 CT ABDOMEN PELVIS W WO CONTRAST: ICD-10-PCS | Mod: 26,,, | Performed by: RADIOLOGY

## 2021-06-02 PROCEDURE — 74178 CT ABD&PLV WO CNTR FLWD CNTR: CPT | Mod: 26,,, | Performed by: RADIOLOGY

## 2021-06-02 RX ADMIN — IOHEXOL 100 ML: 350 INJECTION, SOLUTION INTRAVENOUS at 01:06

## 2021-06-02 RX ADMIN — IOHEXOL 1000 ML: 12 SOLUTION ORAL at 01:06

## 2021-06-21 ENCOUNTER — PATIENT MESSAGE (OUTPATIENT)
Dept: OPTOMETRY | Facility: CLINIC | Age: 31
End: 2021-06-21

## 2021-06-29 ENCOUNTER — OFFICE VISIT (OUTPATIENT)
Dept: OPHTHALMOLOGY | Facility: CLINIC | Age: 31
End: 2021-06-29
Payer: COMMERCIAL

## 2021-06-29 DIAGNOSIS — H30.92 NEURORETINITIS, LEFT: Primary | ICD-10-CM

## 2021-06-29 DIAGNOSIS — G44.229 CHRONIC TENSION-TYPE HEADACHE, NOT INTRACTABLE: ICD-10-CM

## 2021-06-29 PROCEDURE — 92134 POSTERIOR SEGMENT OCT RETINA (OCULAR COHERENCE TOMOGRAPHY)-BOTH EYES: ICD-10-PCS | Mod: S$GLB,,, | Performed by: OPHTHALMOLOGY

## 2021-06-29 PROCEDURE — 1126F AMNT PAIN NOTED NONE PRSNT: CPT | Mod: S$GLB,,, | Performed by: OPHTHALMOLOGY

## 2021-06-29 PROCEDURE — 99999 PR PBB SHADOW E&M-EST. PATIENT-LVL III: CPT | Mod: PBBFAC,,, | Performed by: OPHTHALMOLOGY

## 2021-06-29 PROCEDURE — 92134 CPTRZ OPH DX IMG PST SGM RTA: CPT | Mod: S$GLB,,, | Performed by: OPHTHALMOLOGY

## 2021-06-29 PROCEDURE — 1126F PR PAIN SEVERITY QUANTIFIED, NO PAIN PRESENT: ICD-10-PCS | Mod: S$GLB,,, | Performed by: OPHTHALMOLOGY

## 2021-06-29 PROCEDURE — 92014 COMPRE OPH EXAM EST PT 1/>: CPT | Mod: S$GLB,,, | Performed by: OPHTHALMOLOGY

## 2021-06-29 PROCEDURE — 92014 PR EYE EXAM, EST PATIENT,COMPREHESV: ICD-10-PCS | Mod: S$GLB,,, | Performed by: OPHTHALMOLOGY

## 2021-06-29 PROCEDURE — 99999 PR PBB SHADOW E&M-EST. PATIENT-LVL III: ICD-10-PCS | Mod: PBBFAC,,, | Performed by: OPHTHALMOLOGY

## 2021-06-30 ENCOUNTER — IMMUNIZATION (OUTPATIENT)
Dept: FAMILY MEDICINE | Facility: CLINIC | Age: 31
End: 2021-06-30
Payer: COMMERCIAL

## 2021-06-30 DIAGNOSIS — Z23 NEED FOR VACCINATION: Primary | ICD-10-CM

## 2021-06-30 PROCEDURE — 0002A COVID-19, MRNA, LNP-S, PF, 30 MCG/0.3 ML DOSE VACCINE: CPT | Mod: PBBFAC | Performed by: INTERNAL MEDICINE

## 2021-06-30 PROCEDURE — 91300 COVID-19, MRNA, LNP-S, PF, 30 MCG/0.3 ML DOSE VACCINE: CPT | Mod: PBBFAC | Performed by: INTERNAL MEDICINE

## 2021-07-07 ENCOUNTER — TELEPHONE (OUTPATIENT)
Dept: NEUROLOGY | Facility: CLINIC | Age: 31
End: 2021-07-07

## 2021-07-08 ENCOUNTER — TELEPHONE (OUTPATIENT)
Dept: NEUROLOGY | Facility: CLINIC | Age: 31
End: 2021-07-08

## 2021-07-13 ENCOUNTER — PATIENT MESSAGE (OUTPATIENT)
Dept: NEUROLOGY | Facility: CLINIC | Age: 31
End: 2021-07-13

## 2021-07-13 ENCOUNTER — TELEPHONE (OUTPATIENT)
Dept: NEUROLOGY | Facility: CLINIC | Age: 31
End: 2021-07-13

## 2021-07-23 ENCOUNTER — TELEPHONE (OUTPATIENT)
Dept: NEUROLOGY | Facility: CLINIC | Age: 31
End: 2021-07-23